# Patient Record
Sex: MALE | Race: WHITE | NOT HISPANIC OR LATINO | Employment: FULL TIME | URBAN - NONMETROPOLITAN AREA
[De-identification: names, ages, dates, MRNs, and addresses within clinical notes are randomized per-mention and may not be internally consistent; named-entity substitution may affect disease eponyms.]

---

## 2019-10-08 ENCOUNTER — NURSE TRIAGE (OUTPATIENT)
Dept: CALL CENTER | Facility: HOSPITAL | Age: 49
End: 2019-10-08

## 2019-10-08 NOTE — TELEPHONE ENCOUNTER
"    Reason for Disposition  • General information question, no triage required and triager able to answer question    Additional Information  • Negative: [1] Caller is not with the adult (patient) AND [2] reporting urgent symptoms  • Negative: Lab result questions  • Negative: Medication questions  • Negative: Caller cannot be reached by phone  • Negative: Caller has already spoken to PCP or another triager  • Negative: RN needs further essential information from caller in order to complete triage  • Negative: Requesting regular office appointment  • Negative: [1] Caller requesting NON-URGENT health information AND [2] PCP's office is the best resource  • Negative: Health Information question, no triage required and triager able to answer question    Answer Assessment - Initial Assessment Questions  1. REASON FOR CALL or QUESTION: \"What is your reason for calling today?\" or \"How can I best help you?\" or \"What question do you have that I can help answer?\"      Caller was speaking to someone yesterday about his upcoming surgery. He feels that her name is Meena, but he is not sure where she was calling from. Call was transferred to surgery scheduling.    Protocols used: INFORMATION ONLY CALL-ADULT-      "

## 2019-10-09 ENCOUNTER — APPOINTMENT (OUTPATIENT)
Dept: PREADMISSION TESTING | Facility: HOSPITAL | Age: 49
End: 2019-10-09

## 2019-10-09 ENCOUNTER — HOSPITAL ENCOUNTER (OUTPATIENT)
Dept: GENERAL RADIOLOGY | Facility: HOSPITAL | Age: 49
Discharge: HOME | End: 2019-10-09
Admitting: ORTHOPAEDIC SURGERY

## 2019-10-09 VITALS
HEIGHT: 69 IN | SYSTOLIC BLOOD PRESSURE: 136 MMHG | WEIGHT: 198.41 LBS | DIASTOLIC BLOOD PRESSURE: 73 MMHG | BODY MASS INDEX: 29.39 KG/M2 | RESPIRATION RATE: 16 BRPM | HEART RATE: 66 BPM | OXYGEN SATURATION: 96 %

## 2019-10-09 LAB
ALBUMIN SERPL-MCNC: 4.4 G/DL (ref 3.5–5.2)
ALBUMIN/GLOB SERPL: 1.5 G/DL
ALP SERPL-CCNC: 75 U/L (ref 39–117)
ALT SERPL W P-5'-P-CCNC: 40 U/L (ref 1–41)
ANION GAP SERPL CALCULATED.3IONS-SCNC: 13 MMOL/L (ref 5–15)
APTT PPP: 26.2 SECONDS (ref 24.1–35)
AST SERPL-CCNC: 23 U/L (ref 1–40)
BASOPHILS # BLD AUTO: 0.06 10*3/MM3 (ref 0–0.2)
BASOPHILS NFR BLD AUTO: 0.9 % (ref 0–1.5)
BILIRUB SERPL-MCNC: 0.4 MG/DL (ref 0.2–1.2)
BILIRUB UR QL STRIP: NEGATIVE
BUN BLD-MCNC: 10 MG/DL (ref 6–20)
BUN/CREAT SERPL: 14.7 (ref 7–25)
CALCIUM SPEC-SCNC: 9.1 MG/DL (ref 8.6–10.5)
CHLORIDE SERPL-SCNC: 95 MMOL/L (ref 98–107)
CLARITY UR: CLEAR
CO2 SERPL-SCNC: 26 MMOL/L (ref 22–29)
COLOR UR: YELLOW
CREAT BLD-MCNC: 0.68 MG/DL (ref 0.76–1.27)
DEPRECATED RDW RBC AUTO: 40.4 FL (ref 37–54)
EOSINOPHIL # BLD AUTO: 0.29 10*3/MM3 (ref 0–0.4)
EOSINOPHIL NFR BLD AUTO: 4.5 % (ref 0.3–6.2)
ERYTHROCYTE [DISTWIDTH] IN BLOOD BY AUTOMATED COUNT: 12.4 % (ref 12.3–15.4)
GFR SERPL CREATININE-BSD FRML MDRD: 124 ML/MIN/1.73
GLOBULIN UR ELPH-MCNC: 2.9 GM/DL
GLUCOSE BLD-MCNC: 340 MG/DL (ref 65–99)
GLUCOSE UR STRIP-MCNC: ABNORMAL MG/DL
HCT VFR BLD AUTO: 45 % (ref 37.5–51)
HGB BLD-MCNC: 15.9 G/DL (ref 13–17.7)
HGB UR QL STRIP.AUTO: NEGATIVE
IMM GRANULOCYTES # BLD AUTO: 0.02 10*3/MM3 (ref 0–0.05)
IMM GRANULOCYTES NFR BLD AUTO: 0.3 % (ref 0–0.5)
INR PPP: 0.88 (ref 0.91–1.09)
KETONES UR QL STRIP: NEGATIVE
LEUKOCYTE ESTERASE UR QL STRIP.AUTO: NEGATIVE
LYMPHOCYTES # BLD AUTO: 1.7 10*3/MM3 (ref 0.7–3.1)
LYMPHOCYTES NFR BLD AUTO: 26.3 % (ref 19.6–45.3)
MCH RBC QN AUTO: 31.1 PG (ref 26.6–33)
MCHC RBC AUTO-ENTMCNC: 35.3 G/DL (ref 31.5–35.7)
MCV RBC AUTO: 87.9 FL (ref 79–97)
MONOCYTES # BLD AUTO: 0.79 10*3/MM3 (ref 0.1–0.9)
MONOCYTES NFR BLD AUTO: 12.2 % (ref 5–12)
NEUTROPHILS # BLD AUTO: 3.6 10*3/MM3 (ref 1.7–7)
NEUTROPHILS NFR BLD AUTO: 55.8 % (ref 42.7–76)
NITRITE UR QL STRIP: NEGATIVE
NRBC BLD AUTO-RTO: 0 /100 WBC (ref 0–0.2)
PH UR STRIP.AUTO: 8 [PH] (ref 5–8)
PLATELET # BLD AUTO: 208 10*3/MM3 (ref 140–450)
PMV BLD AUTO: 10 FL (ref 6–12)
POTASSIUM BLD-SCNC: 4.3 MMOL/L (ref 3.5–5.2)
PROT SERPL-MCNC: 7.3 G/DL (ref 6–8.5)
PROT UR QL STRIP: NEGATIVE
PROTHROMBIN TIME: 12.2 SECONDS (ref 11.9–14.6)
RBC # BLD AUTO: 5.12 10*6/MM3 (ref 4.14–5.8)
SODIUM BLD-SCNC: 134 MMOL/L (ref 136–145)
SP GR UR STRIP: >1.03 (ref 1–1.03)
UROBILINOGEN UR QL STRIP: ABNORMAL
WBC NRBC COR # BLD: 6.46 10*3/MM3 (ref 3.4–10.8)

## 2019-10-09 PROCEDURE — 36415 COLL VENOUS BLD VENIPUNCTURE: CPT

## 2019-10-09 PROCEDURE — 85610 PROTHROMBIN TIME: CPT | Performed by: ORTHOPAEDIC SURGERY

## 2019-10-09 PROCEDURE — 71046 X-RAY EXAM CHEST 2 VIEWS: CPT

## 2019-10-09 PROCEDURE — 93005 ELECTROCARDIOGRAM TRACING: CPT

## 2019-10-09 PROCEDURE — 85730 THROMBOPLASTIN TIME PARTIAL: CPT | Performed by: ORTHOPAEDIC SURGERY

## 2019-10-09 PROCEDURE — 93010 ELECTROCARDIOGRAM REPORT: CPT | Performed by: INTERNAL MEDICINE

## 2019-10-09 PROCEDURE — 85025 COMPLETE CBC W/AUTO DIFF WBC: CPT | Performed by: ORTHOPAEDIC SURGERY

## 2019-10-09 PROCEDURE — 81003 URINALYSIS AUTO W/O SCOPE: CPT | Performed by: ORTHOPAEDIC SURGERY

## 2019-10-09 PROCEDURE — 80053 COMPREHEN METABOLIC PANEL: CPT | Performed by: ORTHOPAEDIC SURGERY

## 2019-10-09 RX ORDER — CYCLOBENZAPRINE HYDROCHLORIDE 7.5 MG/1
7.5 TABLET, FILM COATED ORAL 3 TIMES DAILY PRN
COMMUNITY

## 2019-10-09 RX ORDER — TRAMADOL HYDROCHLORIDE 50 MG/1
50 TABLET ORAL EVERY 8 HOURS PRN
COMMUNITY
End: 2019-10-17 | Stop reason: HOSPADM

## 2019-10-09 RX ORDER — MELOXICAM 15 MG/1
15 TABLET ORAL DAILY
COMMUNITY
End: 2019-10-17 | Stop reason: HOSPADM

## 2019-10-09 RX ORDER — IBUPROFEN 600 MG/1
600 TABLET ORAL EVERY 6 HOURS PRN
COMMUNITY
End: 2019-10-17 | Stop reason: HOSPADM

## 2019-10-09 NOTE — DISCHARGE INSTRUCTIONS
DAY OF SURGERY INSTRUCTIONS        YOUR SURGEON: Dr. Pop    PROCEDURE: Anterior Cervical Discectomy Fusion Cervical 3-7    DATE OF SURGERY: October 18, 2019    ARRIVAL TIME: AS DIRECTED BY OFFICE    YOU MAY TAKE THE FOLLOWING MEDICATION(S) THE MORNING OF SURGERY WITH A SIP OF WATER: Tramadol      ALL OTHER HOME MEDICATION CHECK WITH YOUR PHYSICIAN                MANAGING PAIN AFTER SURGERY    We know you are probably wondering what your pain will be like after surgery.  Following surgery it is unrealistic to expect you will not have pain.   Pain is how our bodies let us know that something is wrong or cautions us to be careful.  That said, our goal is to make your pain tolerable.    Methods we may use to treat your pain include (oral or IV medications, PCAs, epidurals, nerve blocks, etc.)   While some procedures require IV pain medications for a short time after surgery, transitioning to pain medications by mouth allows for better management of pain.   Your nurse will encourage you to take oral pain medications whenever possible.  IV medications work almost immediately, but only last a short while.  Taking medications by mouth allows for a more constant level of medication in your blood stream for a longer period of time.      Once your pain is out of control it is harder to get back under control.  It is important you are aware when your next dose of pain medication is due.  If you are admitted, your nurse may write the time of your next dose on the white board in your room to help you remember.      We are interested in your pain and encourage you to inform us about aggravating factors during your visit.   Many times a simple repositioning every few hours can make a big difference.    If your physician says it is okay, do not let your pain prevent you from getting out of bed. Be sure to call your nurse for assistance prior to getting up so you do not fall.      Before surgery, please decide your tolerable  pain goal.  These faces help describe the pain ratings we use on a 0-10 scale.   Be prepared to tell us your goal and whether or not you take pain or anxiety medications at home.          BEFORE YOU COME TO THE HOSPITAL  (Pre-op instructions)  • Do not eat, drink, smoke or chew gum after midnight the night before surgery.  This also includes no mints.  • Morning of surgery take only the medicines you have been instructed with a sip of water unless otherwise instructed  by your physician.  • Do not shave, wear makeup or dark nail polish.  • Remove all jewelry including rings.  • Leave anything you consider valuable at home.  • Leave your suitcase in the car until after your surgery.  • Bring the following with you if applicable:  o Picture ID and insurance, Medicare or Medicaid cards  o Co-pay/deductible required by insurance (cash, check, credit card)  o Copy of advance directive, living will or power-of- documents if not brought to PAT  o CPAP or BIPAP mask and tubing  o Relaxation aids ( book, magazine), etc.  o Hearing aids                        ON THE DAY OF SURGERY  · On the day of surgery check in at registration located at the main entrance of the hospital.   ? You will be registered and given a beeper with instructions where to wait in the main lobby.  ? When your beeper lights up and vibrates a member of the Outpatient Surgery staff will meet you at the double doors under the stair steps and escort you to your preoperative room.   · You may have cloth compression devices placed on your legs. These help to prevent blood clots and reduce swelling in your legs.  · An IV may be inserted into one of your veins.  · In the operating room, you may be given one or more of the following:  ? A medicine to help you relax (sedative).  ? A medicine to numb the area (local anesthetic).  ? A medicine to make you fall asleep (general anesthetic).  ? A medicine that is injected into an area of your body to numb  "everything below the injection site (regional anesthetic).  · Your surgical site will be marked or identified.  · You may be given an antibiotic through your IV to help prevent infection.  Contact a health care provider if you:  · Develop a fever of more than 100.4°F (38°C) or other feelings of illness during the 48 hours before your surgery.  · Have symptoms that get worse.  Have questions or concerns about your surgery    General Anesthesia/Surgery, Adult  General anesthesia is the use of medicines to make a person \"go to sleep\" (unconscious) for a medical procedure. General anesthesia must be used for certain procedures, and is often recommended for procedures that:  · Last a long time.  · Require you to be still or in an unusual position.  · Are major and can cause blood loss.  The medicines used for general anesthesia are called general anesthetics. As well as making you unconscious for a certain amount of time, these medicines:  · Prevent pain.  · Control your blood pressure.  · Relax your muscles.  Tell a health care provider about:  · Any allergies you have.  · All medicines you are taking, including vitamins, herbs, eye drops, creams, and over-the-counter medicines.  · Any problems you or family members have had with anesthetic medicines.  · Types of anesthetics you have had in the past.  · Any blood disorders you have.  · Any surgeries you have had.  · Any medical conditions you have.  · Any recent upper respiratory, chest, or ear infections.  · Any history of:  ? Heart or lung conditions, such as heart failure, sleep apnea, asthma, or chronic obstructive pulmonary disease (COPD).  ?  service.  ? Depression or anxiety.  · Any tobacco or drug use, including marijuana or alcohol use.  · Whether you are pregnant or may be pregnant.  What are the risks?  Generally, this is a safe procedure. However, problems may occur, including:  · Allergic reaction.  · Lung and heart problems.  · Inhaling food or " liquid from the stomach into the lungs (aspiration).  · Nerve injury.  · Air in the bloodstream, which can lead to stroke.  · Extreme agitation or confusion (delirium) when you wake up from the anesthetic.  · Waking up during your procedure and being unable to move. This is rare.  These problems are more likely to develop if you are having a major surgery or if you have an advanced or serious medical condition. You can prevent some of these complications by answering all of your health care provider's questions thoroughly and by following all instructions before your procedure.  General anesthesia can cause side effects, including:  · Nausea or vomiting.  · A sore throat from the breathing tube.  · Hoarseness.  · Wheezing or coughing.  · Shaking chills.  · Tiredness.  · Body aches.  · Anxiety.  · Sleepiness or drowsiness.  · Confusion or agitation.  RISKS AND COMPLICATIONS OF SURGERY  Your health care provider will discuss possible risks and complications with you before surgery. Common risks and complications include:    · Problems due to the use of anesthetics.  · Blood loss and replacement (does not apply to minor surgical procedures).  · Temporary increase in pain due to surgery.  · Uncorrected pain or problems that the surgery was meant to correct.  · Infection.  · New damage.    What happens before the procedure?    Medicines  Ask your health care provider about:  · Changing or stopping your regular medicines. This is especially important if you are taking diabetes medicines or blood thinners.  · Taking medicines such as aspirin and ibuprofen. These medicines can thin your blood. Do not take these medicines unless your health care provider tells you to take them.  · Taking over-the-counter medicines, vitamins, herbs, and supplements. Do not take these during the week before your procedure unless your health care provider approves them.  General instructions  · Starting 3-6 weeks before the procedure, do not  use any products that contain nicotine or tobacco, such as cigarettes and e-cigarettes. If you need help quitting, ask your health care provider.  · If you brush your teeth on the morning of the procedure, make sure to spit out all of the toothpaste.  · Tell your health care provider if you become ill or develop a cold, cough, or fever.  · If instructed by your health care provider, bring your sleep apnea device with you on the day of your surgery (if applicable).  · Ask your health care provider if you will be going home the same day, the following day, or after a longer hospital stay.  ? Plan to have someone take you home from the hospital or clinic.  ? Plan to have a responsible adult care for you for at least 24 hours after you leave the hospital or clinic. This is important.  What happens during the procedure?  · You will be given anesthetics through both of the following:  ? A mask placed over your nose and mouth.  ? An IV in one of your veins.  · You may receive a medicine to help you relax (sedative).  · After you are unconscious, a breathing tube may be inserted down your throat to help you breathe. This will be removed before you wake up.  · An anesthesia specialist will stay with you throughout your procedure. He or she will:  ? Keep you comfortable and safe by continuing to give you medicines and adjusting the amount of medicine that you get.  ? Monitor your blood pressure, pulse, and oxygen levels to make sure that the anesthetics do not cause any problems.  The procedure may vary among health care providers and hospitals.  What happens after the procedure?  · Your blood pressure, temperature, heart rate, breathing rate, and blood oxygen level will be monitored until the medicines you were given have worn off.  · You will wake up in a recovery area. You may wake up slowly.  · If you feel anxious or agitated, you may be given medicine to help you calm down.  · If you will be going home the same day, your  health care provider may check to make sure you can walk, drink, and urinate.  · Your health care provider will treat any pain or side effects you have before you go home.  · Do not drive for 24 hours if you were given a sedative.  Summary  · General anesthesia is used to keep you still and prevent pain during a procedure.  · It is important to tell your healthcare provider about your medical history and any surgeries you have had, and previous experience with anesthesia.  · Follow your healthcare provider’s instructions about when to stop eating, drinking, or taking certain medicines before your procedure.  · Plan to have someone take you home from the hospital or clinic.  This information is not intended to replace advice given to you by your health care provider. Make sure you discuss any questions you have with your health care provider.  Document Released: 03/26/2009 Document Revised: 08/03/2018 Document Reviewed: 08/03/2018  [employer] Interactive Patient Education © 2019 [employer]       Fall Prevention in Hospitals, Adult  As a hospital patient, your condition and the treatments you receive can increase your risk for falls. Some additional risk factors for falls in a hospital include:  · Being in an unfamiliar environment.  · Being on bed rest.  · Your surgery.  · Taking certain medicines.  · Your tubing requirements, such as intravenous (IV) therapy or catheters.  It is important that you learn how to decrease fall risks while at the hospital. Below are important tips that can help prevent falls.  SAFETY TIPS FOR PREVENTING FALLS  Talk about your risk of falling.  · Ask your health care provider why you are at risk for falling. Is it your medicine, illness, tubing placement, or something else?  · Make a plan with your health care provider to keep you safe from falls.  · Ask your health care provider or pharmacist about side effects of your medicines. Some medicines can make you dizzy or affect your  coordination.  Ask for help.  · Ask for help before getting out of bed. You may need to press your call button.  · Ask for assistance in getting safely to the toilet.  · Ask for a walker or cane to be put at your bedside. Ask that most of the side rails on your bed be placed up before your health care provider leaves the room.  · Ask family or friends to sit with you.  · Ask for things that are out of your reach, such as your glasses, hearing aids, telephone, bedside table, or call button.  Follow these tips to avoid falling:  · Stay lying or seated, rather than standing, while waiting for help.  · Wear rubber-soled slippers or shoes whenever you walk in the hospital.  · Avoid quick, sudden movements.  ¨ Change positions slowly.  ¨ Sit on the side of your bed before standing.  ¨ Stand up slowly and wait before you start to walk.  · Let your health care provider know if there is a spill on the floor.  · Pay careful attention to the medical equipment, electrical cords, and tubes around you.  · When you need help, use your call button by your bed or in the bathroom. Wait for one of your health care providers to help you.  · If you feel dizzy or unsure of your footing, return to bed and wait for assistance.  · Avoid being distracted by the TV, telephone, or another person in your room.  · Do not lean or support yourself on rolling objects, such as IV poles or bedside tables.     This information is not intended to replace advice given to you by your health care provider. Make sure you discuss any questions you have with your health care provider.     Document Released: 12/15/2001 Document Revised: 01/08/2016 Document Reviewed: 08/25/2013  [employer] Interactive Patient Education ©2016 [employer]       Surgical Site Infections FAQs  What is a Surgical Site Infection (SSI)?  A surgical site infection is an infection that occurs after surgery in the part of the body where the surgery took place. Most patients who have  surgery do not develop an infection. However, infections develop in about 1 to 3 out of every 100 patients who have surgery.  Some of the common symptoms of a surgical site infection are:  · Redness and pain around the area where you had surgery  · Drainage of cloudy fluid from your surgical wound  · Fever  Can SSIs be treated?  Yes. Most surgical site infections can be treated with antibiotics. The antibiotic given to you depends on the bacteria (germs) causing the infection. Sometimes patients with SSIs also need another surgery to treat the infection.  What are some of the things that hospitals are doing to prevent SSIs?  To prevent SSIs, doctors, nurses, and other healthcare providers:  · Clean their hands and arms up to their elbows with an antiseptic agent just before the surgery.  · Clean their hands with soap and water or an alcohol-based hand rub before and after caring for each patient.  · May remove some of your hair immediately before your surgery using electric clippers if the hair is in the same area where the procedure will occur. They should not shave you with a razor.  · Wear special hair covers, masks, gowns, and gloves during surgery to keep the surgery area clean.  · Give you antibiotics before your surgery starts. In most cases, you should get antibiotics within 60 minutes before the surgery starts and the antibiotics should be stopped within 24 hours after surgery.  · Clean the skin at the site of your surgery with a special soap that kills germs.  What can I do to help prevent SSIs?  Before your surgery:  · Tell your doctor about other medical problems you may have. Health problems such as allergies, diabetes, and obesity could affect your surgery and your treatment.  · Quit smoking. Patients who smoke get more infections. Talk to your doctor about how you can quit before your surgery.  · Do not shave near where you will have surgery. Shaving with a razor can irritate your skin and make it  easier to develop an infection.  At the time of your surgery:  · Speak up if someone tries to shave you with a razor before surgery. Ask why you need to be shaved and talk with your surgeon if you have any concerns.  · Ask if you will get antibiotics before surgery.  After your surgery:  · Make sure that your healthcare providers clean their hands before examining you, either with soap and water or an alcohol-based hand rub.  · If you do not see your providers clean their hands, please ask them to do so.  · Family and friends who visit you should not touch the surgical wound or dressings.  · Family and friends should clean their hands with soap and water or an alcohol-based hand rub before and after visiting you. If you do not see them clean their hands, ask them to clean their hands.  What do I need to do when I go home from the hospital?  · Before you go home, your doctor or nurse should explain everything you need to know about taking care of your wound. Make sure you understand how to care for your wound before you leave the hospital.  · Always clean your hands before and after caring for your wound.  · Before you go home, make sure you know who to contact if you have questions or problems after you get home.  · If you have any symptoms of an infection, such as redness and pain at the surgery site, drainage, or fever, call your doctor immediately.  If you have additional questions, please ask your doctor or nurse.  Developed and co-sponsored by The Society for Healthcare Epidemiology of [place] (SHEA); Infectious Diseases Society of [place] (IDSA); American Hospital Association; Association for Professionals in Infection Control and Epidemiology (APIC); Centers for Disease Control and Prevention (CDC); and The Joint Commission.     This information is not intended to replace advice given to you by your health care provider. Make sure you discuss any questions you have with your health care provider.     Document  Released: 12/23/2014 Document Revised: 01/08/2016 Document Reviewed: 03/02/2016  [employer] Interactive Patient Education ©2016 [employer]           [facility]  CHG 4% Patient Instruction Sheet    Chlorhexidine Before Surgery  Chlorhexidine gluconate (CHG) is a germ-killing (antiseptic) solution that is used to clean the skin. It gets rid of the bacteria that normally live on the skin. Cleaning your skin with CHG before surgery helps lower the risk for infection after surgery.    How to use CHG solution  · You will take 2 showers, one shower the night before surgery, the second shower the morning of surgery before coming to the hospital.  · Use CHG only as told by your health care provider, and follow the instructions on the label.  · Use CHG solution while taking a shower. Follow these steps when using CHG solution (unless your health care provider gives you different instructions):  1. Start the shower.  2. Use your normal soap and shampoo to wash your face and hair.  3. Turn off the shower or move out of the shower stream.  4. Pour the CHG onto a clean washcloth. Do not use any type of brush or rough-edged sponge.  5. Starting at your neck, lather your body down to your toes. Make sure you:  6. Pay special attention to the part of your body where you will be having surgery. Scrub this area for at least 1 minute.  7. Use the full amount of CHG as directed. Usually, this is one half bottle for each shower.  8. Do not use CHG on your head or face. If the solution gets into your ears or eyes, rinse them well with water.  9. Avoid your genital area.  10. Avoid any areas of skin that have broken skin, cuts, or scrapes.  11. Scrub your back and under your arms. Make sure to wash skin folds.  12. Let the lather sit on your skin for 1-2 minutes or as long as told by your health care  provider.  13. Thoroughly rinse your entire body in the shower. Make sure that all body creases and crevices are rinsed  well.  14. Dry off with a clean towel. Do not put any substances on your body afterward, such as powder, lotion, or perfume.  15. Put on clean clothes or pajamas.  16. If it is the night before your surgery, sleep in clean sheets.    What are the risks?  Risks of using CHG include:  · A skin reaction.  · Hearing loss, if CHG gets in your ears.  · Eye injury, if CHG gets in your eyes and is not rinsed out.  · The CHG product catching fire.  Make sure that you avoid smoking and flames after applying CHG to your skin.  Do not use CHG:  · If you have a chlorhexidine allergy or have previously reacted to chlorhexidine.  · On babies younger than 2 months of age.      On the day of surgery, when you are taken to your room in Outpatient Surgery you will be given a CHG prepackaged cloth to wipe the site for your surgery.  How to use CHG prepackaged cloths  · Follow the instructions on the label.  · Use the CHG cloth on clean, dry skin. Follow these steps when using a CHG cloth (unless your health care provider gives you different instructions):  1. Using the CHG cloth, vigorously scrub the part of your body where you will be having surgery. Scrub using a back-and-forth motion for 3 minutes. The area on your body should be completely wet with CHG when you are finished scrubbing.  2. Do not rinse. Discard the cloth and let the area air-dry for 1 minute. Do not put any substances on your body afterward, such as powder, lotion, or perfume.  Contact a health care provider if:  · Your skin gets irritated after scrubbing.  · You have questions about using your solution or cloth.  Get help right away if:  · Your eyes become very red or swollen.  · Your eyes itch badly.  · Your skin itches badly and is red or swollen.  · Your hearing changes.  · You have trouble seeing.  · You have swelling or tingling in your mouth or throat.  · You have trouble breathing.  · You swallow any chlorhexidine.  Summary  · Chlorhexidine gluconate (CHG)  is a germ-killing (antiseptic) solution that is used to clean the skin. Cleaning your skin with CHG before surgery helps lower the risk for infection after surgery.  · You may be given CHG to use at home. It may be in a bottle or in a prepackaged cloth to use on your skin. Carefully follow your health care provider's instructions and the instructions on the product label.  · Do not use CHG if you have a chlorhexidine allergy.  · Contact your health care provider if your skin gets irritated after scrubbing.  This information is not intended to replace advice given to you by your health care provider. Make sure you discuss any questions you have with your health care provider.  Document Released: 09/11/2013 Document Revised: 11/15/2018 Document Reviewed: 11/15/2018  [employer] Interactive Patient Education © 2019 [employer]          PATIENT/FAMILY/RESPONSIBLE PARTY VERBALIZES UNDERSTANDING OF ABOVE EDUCATION.  COPY OF PAIN SCALE GIVEN AND REVIEWED WITH VERBALIZED UNDERSTANDING.

## 2019-10-16 ENCOUNTER — ANESTHESIA EVENT (OUTPATIENT)
Dept: PERIOP | Facility: HOSPITAL | Age: 49
End: 2019-10-16

## 2019-10-16 ENCOUNTER — ANESTHESIA (OUTPATIENT)
Dept: PERIOP | Facility: HOSPITAL | Age: 49
End: 2019-10-16

## 2019-10-16 ENCOUNTER — HOSPITAL ENCOUNTER (INPATIENT)
Facility: HOSPITAL | Age: 49
LOS: 1 days | Discharge: HOME | End: 2019-10-17
Attending: ORTHOPAEDIC SURGERY | Admitting: ORTHOPAEDIC SURGERY

## 2019-10-16 ENCOUNTER — APPOINTMENT (OUTPATIENT)
Dept: GENERAL RADIOLOGY | Facility: HOSPITAL | Age: 49
End: 2019-10-16

## 2019-10-16 DIAGNOSIS — Z78.9 DECREASED ACTIVITIES OF DAILY LIVING (ADL): ICD-10-CM

## 2019-10-16 DIAGNOSIS — Z74.09 IMPAIRED MOBILITY: Primary | ICD-10-CM

## 2019-10-16 PROBLEM — M48.02 STENOSIS, CERVICAL SPINE: Status: ACTIVE | Noted: 2019-10-16

## 2019-10-16 LAB
ABO GROUP BLD: NORMAL
BLD GP AB SCN SERPL QL: NEGATIVE
GLUCOSE BLDC GLUCOMTR-MCNC: 243 MG/DL (ref 70–130)
GLUCOSE BLDC GLUCOMTR-MCNC: 264 MG/DL (ref 70–130)
RH BLD: POSITIVE
T&S EXPIRATION DATE: NORMAL

## 2019-10-16 PROCEDURE — 86850 RBC ANTIBODY SCREEN: CPT | Performed by: ORTHOPAEDIC SURGERY

## 2019-10-16 PROCEDURE — 76000 FLUOROSCOPY <1 HR PHYS/QHP: CPT

## 2019-10-16 PROCEDURE — 25010000002 ONDANSETRON PER 1 MG: Performed by: NURSE ANESTHETIST, CERTIFIED REGISTERED

## 2019-10-16 PROCEDURE — 01N10ZZ RELEASE CERVICAL NERVE, OPEN APPROACH: ICD-10-PCS | Performed by: ORTHOPAEDIC SURGERY

## 2019-10-16 PROCEDURE — 25010000002 DEXAMETHASONE PER 1 MG: Performed by: NURSE ANESTHETIST, CERTIFIED REGISTERED

## 2019-10-16 PROCEDURE — 82962 GLUCOSE BLOOD TEST: CPT

## 2019-10-16 PROCEDURE — 25010000002 DEXAMETHASONE PER 1 MG: Performed by: ANESTHESIOLOGY

## 2019-10-16 PROCEDURE — 86900 BLOOD TYPING SEROLOGIC ABO: CPT | Performed by: ORTHOPAEDIC SURGERY

## 2019-10-16 PROCEDURE — 25010000002 FENTANYL CITRATE (PF) 100 MCG/2ML SOLUTION: Performed by: ANESTHESIOLOGY

## 2019-10-16 PROCEDURE — 4A11X4G MONITORING OF PERIPHERAL NERVOUS ELECTRICAL ACTIVITY, INTRAOPERATIVE, EXTERNAL APPROACH: ICD-10-PCS | Performed by: ORTHOPAEDIC SURGERY

## 2019-10-16 PROCEDURE — 94799 UNLISTED PULMONARY SVC/PX: CPT

## 2019-10-16 PROCEDURE — C1713 ANCHOR/SCREW BN/BN,TIS/BN: HCPCS | Performed by: ORTHOPAEDIC SURGERY

## 2019-10-16 PROCEDURE — 0RG20A0 FUSION OF 2 OR MORE CERVICAL VERTEBRAL JOINTS WITH INTERBODY FUSION DEVICE, ANTERIOR APPROACH, ANTERIOR COLUMN, OPEN APPROACH: ICD-10-PCS | Performed by: ORTHOPAEDIC SURGERY

## 2019-10-16 PROCEDURE — 86901 BLOOD TYPING SEROLOGIC RH(D): CPT | Performed by: ORTHOPAEDIC SURGERY

## 2019-10-16 PROCEDURE — 25010000002 MORPHINE PER 10 MG: Performed by: ANESTHESIOLOGY

## 2019-10-16 PROCEDURE — 25010000002 PROPOFOL 10 MG/ML EMULSION: Performed by: NURSE ANESTHETIST, CERTIFIED REGISTERED

## 2019-10-16 PROCEDURE — 72040 X-RAY EXAM NECK SPINE 2-3 VW: CPT

## 2019-10-16 PROCEDURE — 25010000003 CEFAZOLIN 1-4 GM/50ML-% SOLUTION: Performed by: ORTHOPAEDIC SURGERY

## 2019-10-16 PROCEDURE — 25010000002 MIDAZOLAM PER 1 MG: Performed by: ANESTHESIOLOGY

## 2019-10-16 PROCEDURE — 25010000002 ONDANSETRON PER 1 MG: Performed by: ANESTHESIOLOGY

## 2019-10-16 PROCEDURE — 0RT30ZZ RESECTION OF CERVICAL VERTEBRAL DISC, OPEN APPROACH: ICD-10-PCS | Performed by: ORTHOPAEDIC SURGERY

## 2019-10-16 DEVICE — INTERBODY FUSION DEVICE
Type: IMPLANTABLE DEVICE | Status: FUNCTIONAL
Brand: FORTILINK-C IBF SYSTEM

## 2019-10-16 DEVICE — SCRW VARI SLF/DRL 4X14MM: Type: IMPLANTABLE DEVICE | Status: FUNCTIONAL

## 2019-10-16 DEVICE — IMPLANTABLE DEVICE: Type: IMPLANTABLE DEVICE | Status: FUNCTIONAL

## 2019-10-16 DEVICE — ALLOGRFT FLD BIOBURST 1ML: Type: IMPLANTABLE DEVICE | Status: FUNCTIONAL

## 2019-10-16 DEVICE — BONE CANC CRUSHED 10CC 1TO4MM: Type: IMPLANTABLE DEVICE | Status: FUNCTIONAL

## 2019-10-16 DEVICE — SCRW VARI SLF/DRL 4X16MM: Type: IMPLANTABLE DEVICE | Status: FUNCTIONAL

## 2019-10-16 RX ORDER — ONDANSETRON 2 MG/ML
4 INJECTION INTRAMUSCULAR; INTRAVENOUS EVERY 6 HOURS PRN
Status: DISCONTINUED | OUTPATIENT
Start: 2019-10-16 | End: 2019-10-16

## 2019-10-16 RX ORDER — SODIUM CHLORIDE 9 MG/ML
75 INJECTION, SOLUTION INTRAVENOUS CONTINUOUS
Status: DISCONTINUED | OUTPATIENT
Start: 2019-10-16 | End: 2019-10-17 | Stop reason: HOSPADM

## 2019-10-16 RX ORDER — FAMOTIDINE 10 MG/ML
20 INJECTION, SOLUTION INTRAVENOUS EVERY 12 HOURS SCHEDULED
Status: DISCONTINUED | OUTPATIENT
Start: 2019-10-16 | End: 2019-10-17 | Stop reason: HOSPADM

## 2019-10-16 RX ORDER — IPRATROPIUM BROMIDE AND ALBUTEROL SULFATE 2.5; .5 MG/3ML; MG/3ML
3 SOLUTION RESPIRATORY (INHALATION) ONCE AS NEEDED
Status: DISCONTINUED | OUTPATIENT
Start: 2019-10-16 | End: 2019-10-16 | Stop reason: HOSPADM

## 2019-10-16 RX ORDER — SODIUM CHLORIDE 0.9 % (FLUSH) 0.9 %
3-10 SYRINGE (ML) INJECTION AS NEEDED
Status: DISCONTINUED | OUTPATIENT
Start: 2019-10-16 | End: 2019-10-16

## 2019-10-16 RX ORDER — SODIUM CHLORIDE, SODIUM LACTATE, POTASSIUM CHLORIDE, CALCIUM CHLORIDE 600; 310; 30; 20 MG/100ML; MG/100ML; MG/100ML; MG/100ML
1000 INJECTION, SOLUTION INTRAVENOUS CONTINUOUS
Status: DISCONTINUED | OUTPATIENT
Start: 2019-10-16 | End: 2019-10-16

## 2019-10-16 RX ORDER — SODIUM CHLORIDE 0.9 % (FLUSH) 0.9 %
10 SYRINGE (ML) INJECTION AS NEEDED
Status: DISCONTINUED | OUTPATIENT
Start: 2019-10-16 | End: 2019-10-16

## 2019-10-16 RX ORDER — SODIUM CHLORIDE 0.9 % (FLUSH) 0.9 %
10 SYRINGE (ML) INJECTION AS NEEDED
Status: DISCONTINUED | OUTPATIENT
Start: 2019-10-16 | End: 2019-10-17 | Stop reason: HOSPADM

## 2019-10-16 RX ORDER — SODIUM CHLORIDE, SODIUM LACTATE, POTASSIUM CHLORIDE, CALCIUM CHLORIDE 600; 310; 30; 20 MG/100ML; MG/100ML; MG/100ML; MG/100ML
100 INJECTION, SOLUTION INTRAVENOUS CONTINUOUS PRN
Status: DISCONTINUED | OUTPATIENT
Start: 2019-10-16 | End: 2019-10-16

## 2019-10-16 RX ORDER — DEXAMETHASONE SODIUM PHOSPHATE 4 MG/ML
4 INJECTION, SOLUTION INTRA-ARTICULAR; INTRALESIONAL; INTRAMUSCULAR; INTRAVENOUS; SOFT TISSUE ONCE AS NEEDED
Status: COMPLETED | OUTPATIENT
Start: 2019-10-16 | End: 2019-10-16

## 2019-10-16 RX ORDER — OXYCODONE HCL 20 MG/1
20 TABLET, FILM COATED, EXTENDED RELEASE ORAL ONCE
Status: COMPLETED | OUTPATIENT
Start: 2019-10-16 | End: 2019-10-16

## 2019-10-16 RX ORDER — MAGNESIUM HYDROXIDE 1200 MG/15ML
LIQUID ORAL AS NEEDED
Status: DISCONTINUED | OUTPATIENT
Start: 2019-10-16 | End: 2019-10-16 | Stop reason: HOSPADM

## 2019-10-16 RX ORDER — FENTANYL CITRATE 50 UG/ML
25 INJECTION, SOLUTION INTRAMUSCULAR; INTRAVENOUS
Status: DISCONTINUED | OUTPATIENT
Start: 2019-10-16 | End: 2019-10-16

## 2019-10-16 RX ORDER — ONDANSETRON 4 MG/1
4 TABLET, FILM COATED ORAL EVERY 6 HOURS PRN
Status: DISCONTINUED | OUTPATIENT
Start: 2019-10-16 | End: 2019-10-17 | Stop reason: HOSPADM

## 2019-10-16 RX ORDER — CEFAZOLIN SODIUM 1 G/50ML
1 INJECTION, SOLUTION INTRAVENOUS EVERY 8 HOURS SCHEDULED
Status: DISCONTINUED | OUTPATIENT
Start: 2019-10-16 | End: 2019-10-17 | Stop reason: HOSPADM

## 2019-10-16 RX ORDER — LABETALOL HYDROCHLORIDE 5 MG/ML
5 INJECTION, SOLUTION INTRAVENOUS
Status: DISCONTINUED | OUTPATIENT
Start: 2019-10-16 | End: 2019-10-16 | Stop reason: HOSPADM

## 2019-10-16 RX ORDER — BUPIVACAINE HCL/0.9 % NACL/PF 0.1 %
2 PLASTIC BAG, INJECTION (ML) EPIDURAL ONCE
Status: COMPLETED | OUTPATIENT
Start: 2019-10-16 | End: 2019-10-16

## 2019-10-16 RX ORDER — KETAMINE HYDROCHLORIDE 50 MG/ML
INJECTION, SOLUTION, CONCENTRATE INTRAMUSCULAR; INTRAVENOUS AS NEEDED
Status: DISCONTINUED | OUTPATIENT
Start: 2019-10-16 | End: 2019-10-16 | Stop reason: SURG

## 2019-10-16 RX ORDER — MINERAL OIL
OIL (ML) MISCELLANEOUS AS NEEDED
Status: DISCONTINUED | OUTPATIENT
Start: 2019-10-16 | End: 2019-10-16 | Stop reason: HOSPADM

## 2019-10-16 RX ORDER — HYDRALAZINE HYDROCHLORIDE 20 MG/ML
5 INJECTION INTRAMUSCULAR; INTRAVENOUS
Status: DISCONTINUED | OUTPATIENT
Start: 2019-10-16 | End: 2019-10-16 | Stop reason: HOSPADM

## 2019-10-16 RX ORDER — SODIUM CHLORIDE, SODIUM LACTATE, POTASSIUM CHLORIDE, CALCIUM CHLORIDE 600; 310; 30; 20 MG/100ML; MG/100ML; MG/100ML; MG/100ML
9 INJECTION, SOLUTION INTRAVENOUS CONTINUOUS
Status: DISCONTINUED | OUTPATIENT
Start: 2019-10-16 | End: 2019-10-16

## 2019-10-16 RX ORDER — FAMOTIDINE 10 MG/ML
20 INJECTION, SOLUTION INTRAVENOUS
Status: DISCONTINUED | OUTPATIENT
Start: 2019-10-16 | End: 2019-10-16

## 2019-10-16 RX ORDER — ONDANSETRON 2 MG/ML
4 INJECTION INTRAMUSCULAR; INTRAVENOUS AS NEEDED
Status: DISCONTINUED | OUTPATIENT
Start: 2019-10-16 | End: 2019-10-16 | Stop reason: HOSPADM

## 2019-10-16 RX ORDER — DIPHENHYDRAMINE HCL 25 MG
25 CAPSULE ORAL NIGHTLY PRN
Status: DISCONTINUED | OUTPATIENT
Start: 2019-10-16 | End: 2019-10-17 | Stop reason: HOSPADM

## 2019-10-16 RX ORDER — ACETAMINOPHEN 500 MG
1000 TABLET ORAL ONCE
Status: COMPLETED | OUTPATIENT
Start: 2019-10-16 | End: 2019-10-16

## 2019-10-16 RX ORDER — FENTANYL CITRATE 50 UG/ML
25 INJECTION, SOLUTION INTRAMUSCULAR; INTRAVENOUS AS NEEDED
Status: DISCONTINUED | OUTPATIENT
Start: 2019-10-16 | End: 2019-10-16 | Stop reason: HOSPADM

## 2019-10-16 RX ORDER — MORPHINE SULFATE 2 MG/ML
2 INJECTION, SOLUTION INTRAMUSCULAR; INTRAVENOUS
Status: DISCONTINUED | OUTPATIENT
Start: 2019-10-16 | End: 2019-10-16 | Stop reason: HOSPADM

## 2019-10-16 RX ORDER — OXYCODONE AND ACETAMINOPHEN 10; 325 MG/1; MG/1
2 TABLET ORAL EVERY 4 HOURS PRN
Status: DISCONTINUED | OUTPATIENT
Start: 2019-10-16 | End: 2019-10-17 | Stop reason: HOSPADM

## 2019-10-16 RX ORDER — FAMOTIDINE 20 MG/1
20 TABLET, FILM COATED ORAL EVERY 12 HOURS SCHEDULED
Status: DISCONTINUED | OUTPATIENT
Start: 2019-10-16 | End: 2019-10-17 | Stop reason: HOSPADM

## 2019-10-16 RX ORDER — MIDAZOLAM HYDROCHLORIDE 1 MG/ML
1 INJECTION INTRAMUSCULAR; INTRAVENOUS
Status: DISCONTINUED | OUTPATIENT
Start: 2019-10-16 | End: 2019-10-16

## 2019-10-16 RX ORDER — ONDANSETRON 2 MG/ML
4 INJECTION INTRAMUSCULAR; INTRAVENOUS EVERY 6 HOURS PRN
Status: DISCONTINUED | OUTPATIENT
Start: 2019-10-16 | End: 2019-10-17 | Stop reason: HOSPADM

## 2019-10-16 RX ORDER — SUFENTANIL CITRATE 50 UG/ML
INJECTION EPIDURAL; INTRAVENOUS AS NEEDED
Status: DISCONTINUED | OUTPATIENT
Start: 2019-10-16 | End: 2019-10-16 | Stop reason: SURG

## 2019-10-16 RX ORDER — LIDOCAINE HYDROCHLORIDE 20 MG/ML
INJECTION, SOLUTION INFILTRATION; PERINEURAL AS NEEDED
Status: DISCONTINUED | OUTPATIENT
Start: 2019-10-16 | End: 2019-10-16 | Stop reason: SURG

## 2019-10-16 RX ORDER — OXYCODONE AND ACETAMINOPHEN 10; 325 MG/1; MG/1
1 TABLET ORAL ONCE AS NEEDED
Status: DISCONTINUED | OUTPATIENT
Start: 2019-10-16 | End: 2019-10-16 | Stop reason: HOSPADM

## 2019-10-16 RX ORDER — NALOXONE HCL 0.4 MG/ML
0.04 VIAL (ML) INJECTION AS NEEDED
Status: DISCONTINUED | OUTPATIENT
Start: 2019-10-16 | End: 2019-10-16 | Stop reason: HOSPADM

## 2019-10-16 RX ORDER — SODIUM CHLORIDE 0.9 % (FLUSH) 0.9 %
3 SYRINGE (ML) INJECTION EVERY 12 HOURS SCHEDULED
Status: DISCONTINUED | OUTPATIENT
Start: 2019-10-16 | End: 2019-10-17 | Stop reason: HOSPADM

## 2019-10-16 RX ORDER — PROPOFOL 10 MG/ML
VIAL (ML) INTRAVENOUS AS NEEDED
Status: DISCONTINUED | OUTPATIENT
Start: 2019-10-16 | End: 2019-10-16 | Stop reason: SURG

## 2019-10-16 RX ORDER — METOCLOPRAMIDE HYDROCHLORIDE 5 MG/ML
5 INJECTION INTRAMUSCULAR; INTRAVENOUS
Status: DISCONTINUED | OUTPATIENT
Start: 2019-10-16 | End: 2019-10-16 | Stop reason: HOSPADM

## 2019-10-16 RX ORDER — SODIUM CHLORIDE 0.9 % (FLUSH) 0.9 %
3 SYRINGE (ML) INJECTION EVERY 12 HOURS SCHEDULED
Status: DISCONTINUED | OUTPATIENT
Start: 2019-10-16 | End: 2019-10-16

## 2019-10-16 RX ORDER — ROCURONIUM BROMIDE 10 MG/ML
INJECTION, SOLUTION INTRAVENOUS AS NEEDED
Status: DISCONTINUED | OUTPATIENT
Start: 2019-10-16 | End: 2019-10-16 | Stop reason: SURG

## 2019-10-16 RX ORDER — DEXAMETHASONE SODIUM PHOSPHATE 4 MG/ML
INJECTION, SOLUTION INTRA-ARTICULAR; INTRALESIONAL; INTRAMUSCULAR; INTRAVENOUS; SOFT TISSUE AS NEEDED
Status: DISCONTINUED | OUTPATIENT
Start: 2019-10-16 | End: 2019-10-16 | Stop reason: SURG

## 2019-10-16 RX ORDER — CYCLOBENZAPRINE HCL 10 MG
10 TABLET ORAL 3 TIMES DAILY PRN
Status: DISCONTINUED | OUTPATIENT
Start: 2019-10-16 | End: 2019-10-17 | Stop reason: HOSPADM

## 2019-10-16 RX ORDER — SODIUM CHLORIDE 0.9 % (FLUSH) 0.9 %
3 SYRINGE (ML) INJECTION AS NEEDED
Status: DISCONTINUED | OUTPATIENT
Start: 2019-10-16 | End: 2019-10-16

## 2019-10-16 RX ORDER — MIDAZOLAM HYDROCHLORIDE 1 MG/ML
2 INJECTION INTRAMUSCULAR; INTRAVENOUS
Status: DISCONTINUED | OUTPATIENT
Start: 2019-10-16 | End: 2019-10-16

## 2019-10-16 RX ORDER — DEXTROSE MONOHYDRATE 25 G/50ML
12.5 INJECTION, SOLUTION INTRAVENOUS AS NEEDED
Status: DISCONTINUED | OUTPATIENT
Start: 2019-10-16 | End: 2019-10-16

## 2019-10-16 RX ORDER — FLUMAZENIL 0.1 MG/ML
0.2 INJECTION INTRAVENOUS AS NEEDED
Status: DISCONTINUED | OUTPATIENT
Start: 2019-10-16 | End: 2019-10-16 | Stop reason: HOSPADM

## 2019-10-16 RX ORDER — ONDANSETRON 2 MG/ML
INJECTION INTRAMUSCULAR; INTRAVENOUS AS NEEDED
Status: DISCONTINUED | OUTPATIENT
Start: 2019-10-16 | End: 2019-10-16 | Stop reason: SURG

## 2019-10-16 RX ADMIN — SUFENTANIL CITRATE 20 MCG: 50 INJECTION EPIDURAL; INTRAVENOUS at 14:27

## 2019-10-16 RX ADMIN — KETAMINE HYDROCHLORIDE 25 MG: 50 INJECTION, SOLUTION INTRAMUSCULAR; INTRAVENOUS at 15:45

## 2019-10-16 RX ADMIN — DEXAMETHASONE SODIUM PHOSPHATE 4 MG: 4 INJECTION, SOLUTION INTRAMUSCULAR; INTRAVENOUS at 17:37

## 2019-10-16 RX ADMIN — ROCURONIUM BROMIDE 5 MG: 10 INJECTION INTRAVENOUS at 14:27

## 2019-10-16 RX ADMIN — MORPHINE SULFATE 2 MG: 2 INJECTION, SOLUTION INTRAMUSCULAR; INTRAVENOUS at 19:24

## 2019-10-16 RX ADMIN — SUFENTANIL CITRATE 30 MCG: 50 INJECTION EPIDURAL; INTRAVENOUS at 14:55

## 2019-10-16 RX ADMIN — DEXAMETHASONE SODIUM PHOSPHATE 4 MG: 4 INJECTION, SOLUTION INTRAMUSCULAR; INTRAVENOUS at 12:55

## 2019-10-16 RX ADMIN — SODIUM CHLORIDE, POTASSIUM CHLORIDE, SODIUM LACTATE AND CALCIUM CHLORIDE: 600; 310; 30; 20 INJECTION, SOLUTION INTRAVENOUS at 15:35

## 2019-10-16 RX ADMIN — ACETAMINOPHEN 1000 MG: 500 TABLET, FILM COATED ORAL at 12:55

## 2019-10-16 RX ADMIN — FAMOTIDINE 20 MG: 10 INJECTION, SOLUTION INTRAVENOUS at 12:55

## 2019-10-16 RX ADMIN — HYDROMORPHONE HYDROCHLORIDE 1 MG: 1 INJECTION, SOLUTION INTRAMUSCULAR; INTRAVENOUS; SUBCUTANEOUS at 21:00

## 2019-10-16 RX ADMIN — KETAMINE HYDROCHLORIDE 25 MG: 50 INJECTION, SOLUTION INTRAMUSCULAR; INTRAVENOUS at 16:30

## 2019-10-16 RX ADMIN — METFORMIN HYDROCHLORIDE 500 MG: 500 TABLET ORAL at 22:57

## 2019-10-16 RX ADMIN — CEFAZOLIN SODIUM 1 G: 1 INJECTION, SOLUTION INTRAVENOUS at 22:57

## 2019-10-16 RX ADMIN — FENTANYL CITRATE 25 MCG: 50 INJECTION INTRAMUSCULAR; INTRAVENOUS at 19:37

## 2019-10-16 RX ADMIN — PROPOFOL 200 MCG/KG/MIN: 10 INJECTION, EMULSION INTRAVENOUS at 14:28

## 2019-10-16 RX ADMIN — KETAMINE HYDROCHLORIDE 25 MG: 50 INJECTION, SOLUTION INTRAMUSCULAR; INTRAVENOUS at 15:00

## 2019-10-16 RX ADMIN — SUFENTANIL CITRATE 20 MCG: 50 INJECTION EPIDURAL; INTRAVENOUS at 15:46

## 2019-10-16 RX ADMIN — OXYCODONE HYDROCHLORIDE AND ACETAMINOPHEN 2 TABLET: 10; 325 TABLET ORAL at 22:56

## 2019-10-16 RX ADMIN — SODIUM CHLORIDE, PRESERVATIVE FREE 3 ML: 5 INJECTION INTRAVENOUS at 22:59

## 2019-10-16 RX ADMIN — KETAMINE HYDROCHLORIDE 25 MG: 50 INJECTION, SOLUTION INTRAMUSCULAR; INTRAVENOUS at 14:27

## 2019-10-16 RX ADMIN — Medication 2 G: at 14:30

## 2019-10-16 RX ADMIN — MIDAZOLAM 2 MG: 1 INJECTION INTRAMUSCULAR; INTRAVENOUS at 12:56

## 2019-10-16 RX ADMIN — FENTANYL CITRATE 25 MCG: 50 INJECTION INTRAMUSCULAR; INTRAVENOUS at 19:35

## 2019-10-16 RX ADMIN — ONDANSETRON HYDROCHLORIDE 4 MG: 2 SOLUTION INTRAMUSCULAR; INTRAVENOUS at 17:30

## 2019-10-16 RX ADMIN — CYCLOBENZAPRINE 10 MG: 10 TABLET, FILM COATED ORAL at 21:01

## 2019-10-16 RX ADMIN — LIDOCAINE HYDROCHLORIDE 100 MG: 20 INJECTION, SOLUTION INFILTRATION; PERINEURAL at 14:27

## 2019-10-16 RX ADMIN — SODIUM CHLORIDE, POTASSIUM CHLORIDE, SODIUM LACTATE AND CALCIUM CHLORIDE 100 ML/HR: 600; 310; 30; 20 INJECTION, SOLUTION INTRAVENOUS at 09:46

## 2019-10-16 RX ADMIN — FAMOTIDINE 20 MG: 10 INJECTION, SOLUTION INTRAVENOUS at 22:57

## 2019-10-16 RX ADMIN — SODIUM CHLORIDE 75 ML/HR: 9 INJECTION, SOLUTION INTRAVENOUS at 23:20

## 2019-10-16 RX ADMIN — ONDANSETRON HYDROCHLORIDE 4 MG: 2 SOLUTION INTRAMUSCULAR; INTRAVENOUS at 20:11

## 2019-10-16 RX ADMIN — SUFENTANIL CITRATE 30 MCG: 50 INJECTION EPIDURAL; INTRAVENOUS at 15:20

## 2019-10-16 RX ADMIN — FENTANYL CITRATE 25 MCG: 50 INJECTION INTRAMUSCULAR; INTRAVENOUS at 19:30

## 2019-10-16 RX ADMIN — OXYCODONE HYDROCHLORIDE 20 MG: 20 TABLET, FILM COATED, EXTENDED RELEASE ORAL at 09:53

## 2019-10-16 RX ADMIN — PROPOFOL 200 MG: 10 INJECTION, EMULSION INTRAVENOUS at 14:27

## 2019-10-16 RX ADMIN — FENTANYL CITRATE 25 MCG: 50 INJECTION INTRAMUSCULAR; INTRAVENOUS at 19:25

## 2019-10-16 RX ADMIN — SODIUM CHLORIDE, POTASSIUM CHLORIDE, SODIUM LACTATE AND CALCIUM CHLORIDE 1000 ML: 600; 310; 30; 20 INJECTION, SOLUTION INTRAVENOUS at 09:45

## 2019-10-16 NOTE — ANESTHESIA PROCEDURE NOTES
Airway  Urgency: elective    Date/Time: 10/16/2019 2:28 PM  Airway not difficult    General Information and Staff    Patient location during procedure: OR  CRNA: Eliu Hagan CRNA    Indications and Patient Condition  Indications for airway management: airway protection    Preoxygenated: yes  MILS maintained throughout  Mask difficulty assessment: 1 - vent by mask    Final Airway Details  Final airway type: endotracheal airway      Successful airway: ETT  Cuffed: yes   Successful intubation technique: direct laryngoscopy  Endotracheal tube insertion site: oral  Blade: Adorno  Blade size: 4  ETT size (mm): 7.5  Cormack-Lehane Classification: grade I - full view of glottis  Placement verified by: chest auscultation and capnometry   Cuff volume (mL): 5  Measured from: lips  ETT/EBT  to lips (cm): 22  Number of attempts at approach: 1  Assessment: lips, teeth, and gum same as pre-op and atraumatic intubation

## 2019-10-16 NOTE — ANESTHESIA PREPROCEDURE EVALUATION
Anesthesia Evaluation     Patient summary reviewed   NPO Solid Status: > 8 hours             Airway   Mallampati: III  TM distance: >3 FB  Neck ROM: full  Dental      Pulmonary    (+) a smoker,   (-) COPD, asthma, sleep apnea  Cardiovascular   Exercise tolerance: excellent (>7 METS)    (-) pacemaker, past MI, angina, cardiac stents      Neuro/Psych  (-) seizures, TIA, CVA  GI/Hepatic/Renal/Endo    (+)   diabetes mellitus,   (-) GERD, liver disease, no renal disease    Musculoskeletal     Abdominal    Substance History      OB/GYN          Other                        Anesthesia Plan    ASA 2     general     intravenous induction   Anesthetic plan, all risks, benefits, and alternatives have been provided, discussed and informed consent has been obtained with: patient.  Use of blood products discussed with patient  Consented to blood products.
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
## 2019-10-16 NOTE — ANESTHESIA POSTPROCEDURE EVALUATION
Patient: Tobin Haile    Procedure Summary     Date:  10/16/19 Room / Location:   [facility]  /  [facility]    Anesthesia Start:  1420 Anesthesia Stop:  1812    Procedure:  ANTERIOR CERVICAL DISCECTOMY FUSION C3-7 (N/A Spine Cervical) Diagnosis:  (M54.12)    Surgeon:  LAN Pop MD Provider:  Eliu Hagan CRNA    Anesthesia Type:  general ASA Status:  2          Anesthesia Type: general  Last vitals  BP   156/80 (10/16/19 1820)   Temp   98.5 °F (36.9 °C) (10/16/19 1811)   Pulse   93 (10/16/19 1820)   Resp   16 (10/16/19 1820)     SpO2   95 % (10/16/19 1820)     Post Anesthesia Care and Evaluation    Patient location during evaluation: PACU  Patient participation: complete - patient participated  Level of consciousness: awake and alert  Pain management: adequate  Airway patency: patent  Anesthetic complications: No anesthetic complications    Cardiovascular status: acceptable  Respiratory status: acceptable  Hydration status: acceptable    Comments: Blood pressure 156/80, pulse 93, temperature 98.5 °F (36.9 °C), temperature source Temporal, resp. rate 16, SpO2 95 %.    Pt discharged from PACU based on marvin score >8

## 2019-10-17 VITALS
OXYGEN SATURATION: 95 % | RESPIRATION RATE: 18 BRPM | TEMPERATURE: 98 F | DIASTOLIC BLOOD PRESSURE: 77 MMHG | HEART RATE: 90 BPM | SYSTOLIC BLOOD PRESSURE: 130 MMHG

## 2019-10-17 LAB
ANION GAP SERPL CALCULATED.3IONS-SCNC: 12 MMOL/L (ref 5–15)
BASOPHILS # BLD AUTO: 0.05 10*3/MM3 (ref 0–0.2)
BASOPHILS NFR BLD AUTO: 0.3 % (ref 0–1.5)
BUN BLD-MCNC: 11 MG/DL (ref 6–20)
BUN/CREAT SERPL: 16.9 (ref 7–25)
CALCIUM SPEC-SCNC: 8.6 MG/DL (ref 8.6–10.5)
CHLORIDE SERPL-SCNC: 94 MMOL/L (ref 98–107)
CO2 SERPL-SCNC: 29 MMOL/L (ref 22–29)
CREAT BLD-MCNC: 0.65 MG/DL (ref 0.76–1.27)
DEPRECATED RDW RBC AUTO: 39.8 FL (ref 37–54)
EOSINOPHIL # BLD AUTO: 0 10*3/MM3 (ref 0–0.4)
EOSINOPHIL NFR BLD AUTO: 0 % (ref 0.3–6.2)
ERYTHROCYTE [DISTWIDTH] IN BLOOD BY AUTOMATED COUNT: 12.2 % (ref 12.3–15.4)
GFR SERPL CREATININE-BSD FRML MDRD: 131 ML/MIN/1.73
GLUCOSE BLD-MCNC: 248 MG/DL (ref 65–99)
HCT VFR BLD AUTO: 44.4 % (ref 37.5–51)
HGB BLD-MCNC: 15.5 G/DL (ref 13–17.7)
IMM GRANULOCYTES # BLD AUTO: 0.12 10*3/MM3 (ref 0–0.05)
IMM GRANULOCYTES NFR BLD AUTO: 0.6 % (ref 0–0.5)
LYMPHOCYTES # BLD AUTO: 0.82 10*3/MM3 (ref 0.7–3.1)
LYMPHOCYTES NFR BLD AUTO: 4.2 % (ref 19.6–45.3)
MCH RBC QN AUTO: 30.9 PG (ref 26.6–33)
MCHC RBC AUTO-ENTMCNC: 34.9 G/DL (ref 31.5–35.7)
MCV RBC AUTO: 88.4 FL (ref 79–97)
MONOCYTES # BLD AUTO: 1.73 10*3/MM3 (ref 0.1–0.9)
MONOCYTES NFR BLD AUTO: 8.8 % (ref 5–12)
NEUTROPHILS # BLD AUTO: 16.94 10*3/MM3 (ref 1.7–7)
NEUTROPHILS NFR BLD AUTO: 86.1 % (ref 42.7–76)
NRBC BLD AUTO-RTO: 0 /100 WBC (ref 0–0.2)
PLATELET # BLD AUTO: 218 10*3/MM3 (ref 140–450)
PMV BLD AUTO: 9.5 FL (ref 6–12)
POTASSIUM BLD-SCNC: 4.3 MMOL/L (ref 3.5–5.2)
RBC # BLD AUTO: 5.02 10*6/MM3 (ref 4.14–5.8)
SODIUM BLD-SCNC: 135 MMOL/L (ref 136–145)
WBC NRBC COR # BLD: 19.66 10*3/MM3 (ref 3.4–10.8)

## 2019-10-17 PROCEDURE — 97162 PT EVAL MOD COMPLEX 30 MIN: CPT

## 2019-10-17 PROCEDURE — 25010000002 ONDANSETRON PER 1 MG: Performed by: ORTHOPAEDIC SURGERY

## 2019-10-17 PROCEDURE — 97166 OT EVAL MOD COMPLEX 45 MIN: CPT

## 2019-10-17 PROCEDURE — 25010000003 CEFAZOLIN 1-4 GM/50ML-% SOLUTION: Performed by: ORTHOPAEDIC SURGERY

## 2019-10-17 PROCEDURE — 80048 BASIC METABOLIC PNL TOTAL CA: CPT | Performed by: ORTHOPAEDIC SURGERY

## 2019-10-17 PROCEDURE — 85025 COMPLETE CBC W/AUTO DIFF WBC: CPT | Performed by: ORTHOPAEDIC SURGERY

## 2019-10-17 PROCEDURE — 97535 SELF CARE MNGMENT TRAINING: CPT

## 2019-10-17 RX ADMIN — METFORMIN HYDROCHLORIDE 500 MG: 500 TABLET ORAL at 08:30

## 2019-10-17 RX ADMIN — FAMOTIDINE 20 MG: 20 TABLET, FILM COATED ORAL at 08:30

## 2019-10-17 RX ADMIN — HYDROMORPHONE HYDROCHLORIDE 1 MG: 1 INJECTION, SOLUTION INTRAMUSCULAR; INTRAVENOUS; SUBCUTANEOUS at 03:05

## 2019-10-17 RX ADMIN — ONDANSETRON HYDROCHLORIDE 4 MG: 2 SOLUTION INTRAMUSCULAR; INTRAVENOUS at 12:15

## 2019-10-17 RX ADMIN — CYCLOBENZAPRINE 10 MG: 10 TABLET, FILM COATED ORAL at 16:31

## 2019-10-17 RX ADMIN — OXYCODONE HYDROCHLORIDE AND ACETAMINOPHEN 2 TABLET: 10; 325 TABLET ORAL at 07:00

## 2019-10-17 RX ADMIN — CEFAZOLIN SODIUM 1 G: 1 INJECTION, SOLUTION INTRAVENOUS at 07:00

## 2019-10-17 NOTE — PLAN OF CARE
Problem: Patient Care Overview  Goal: Plan of Care Review   10/17/19 1014   Coping/Psychosocial   Plan of Care Reviewed With patient   Plan of Care Review   Progress improving   OTHER   Outcome Summary PT eval complete. pt performed bed mobility with supervision. pt was supervision at first with sit to stand but changed to CGA when he began to be very nausated. Before patient began feeling sick, patient was able to walk approx 20 feet to and from bathroom with CGA for safety, but could not attempt further ambulation due to nausea arising. the nausea the patient experienced severly impacted his functional ability. Recommend that pt is not discharged from hospital until nausea is under control due to the fact pt's function was severly impacted. Skilled PT is warranted to improve function and teach safety strategies related to following surigcal precautions and safely functioning at current abilities. anticipated D/C to home with home health and help from family when needed.
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
## 2019-10-17 NOTE — THERAPY EVALUATION
Acute Care - Occupational Therapy Initial Evaluation  [facility]     Patient Name: Tobin Haile  : 1970  MRN: [id number]  Today's Date: 10/17/2019  Onset of Illness/Injury or Date of Surgery: 10/16/19  Date of Referral to OT: 10/16/19  Referring Physician: Dr. Pop    Admit Date: 10/16/2019       ICD-10-CM ICD-9-CM   1. Impaired mobility Z74.09 799.89   2. Decreased activities of daily living (ADL) Z78.9 V49.89     Patient Active Problem List   Diagnosis   • Stenosis, cervical spine     Past Medical History:   Diagnosis Date   • Diabetes mellitus (CMS/HCC)      History reviewed. No pertinent surgical history.       OT ASSESSMENT FLOWSHEET (last 12 hours)      Occupational Therapy Evaluation     Row Name 10/17/19 0800                   OT Evaluation Time/Intention    Subjective Information  complains of;pain  -AC (r) LS (t) AC (c)        Document Type  evaluation  -AC (r) LS (t) AC (c)        Mode of Treatment  occupational therapy  -AC (r) LS (t) AC (c)        Patient Effort  good  -AC (r) LS (t) AC (c)        Symptoms Noted During/After Treatment  dizziness;increased pain;other (see comments) nausea  -AC (r) LS (t) AC (c)           General Information    Patient Profile Reviewed?  yes  -AC (r) LS (t) AC (c)        Onset of Illness/Injury or Date of Surgery  10/16/19  -AC (r) LS (t) AC (c)        Referring Physician  Dr. Pop  -AC (r) LS (t) AC (c)        Patient Observations  alert;cooperative;agree to therapy  -AC (r) LS (t) AC (c)        Patient/Family Observations  wife present  -AC (r) LS (t) AC (c)        General Observations of Patient  lying in fowlers, ASPEN collar present, dressing to ant. neck  -AC (r) LS (t) AC (c)        Prior Level of Function  independent:;all household mobility;community mobility;ADL's;dressing;bathing;cooking;cleaning  -AC (r) LS (t) AC (c)        Equipment Currently Used at Home  none  -AC (r) LS (t) AC (c)        Pertinent History of Current Functional Problem  Dx:  cervical stenosis s/p anterior discectomy w/ interbody fusion C3-7 (10/16/19)  -AC (r) LS (t) AC (c)        Existing Precautions/Restrictions  fall;spinal  -AC (r) LS (t) AC (c)        Risks Reviewed  patient:;spouse/S.O.:;LOB;nausea/vomiting;dizziness;increased discomfort;change in vital signs;increased drainage;lines disloged  -AC (r) LS (t) AC (c)        Benefits Reviewed  patient:;spouse/S.O.:;improve function;increase independence;increase strength;increase balance;decrease pain;decrease risk of DVT;improve skin integrity;increase knowledge  -AC (r) LS (t) AC (c)        Barriers to Rehab  none identified  -AC (r) LS (t) AC (c)           Relationship/Environment    Lives With  child(arik), adult;spouse  -AC (r) LS (t) AC (c)        Family Caregiver if Needed  child(arik), adult;spouse  -AC (r) LS (t) AC (c)           Resource/Environmental Concerns    Current Living Arrangements  home/apartment/condo  -AC (r) LS (t) AC (c)           Home Main Entrance    Number of Stairs, Main Entrance  one  -AC (r) LS (t) AC (c)        Stair Railings, Main Entrance  none  -AC (r) LS (t) AC (c)           Cognitive Assessment/Interventions    Additional Documentation  Cognitive Assessment/Intervention (Group)  -AC (r) LS (t) AC (c)           Cognitive Assessment/Intervention- PT/OT    Affect/Mental Status (Cognitive)  WNL  -AC (r) LS (t) AC (c)        Orientation Status (Cognition)  oriented x 4  -AC (r) LS (t) AC (c)        Cognitive Function (Cognitive)  WNL  -AC (r) LS (t) AC (c)        Personal Safety Interventions  elopement precautions initiated;fall prevention program maintained;gait belt;muscle strengthening facilitated;nonskid shoes/slippers when out of bed;supervised activity  -AC (r) LS (t) AC (c)           Safety Issues, Functional Mobility    Impairments Affecting Function (Mobility)  balance;endurance/activity tolerance;pain;postural/trunk control  -AC (r) LS (t) AC (c)           Bed Mobility Assessment/Treatment     Bed Mobility Assessment/Treatment  scooting/bridging;supine-sit;sit-supine  -AC (r) LS (t) AC (c)        Scooting/Bridging [place] (Bed Mobility)  supervision  -AC (r) LS (t) AC (c)        Supine-Sit [place] (Bed Mobility)  supervision  -AC (r) LS (t) AC (c)        Sit-Supine [place] (Bed Mobility)  verbal cues;contact guard  -AC (r) LS (t) AC (c)        Assistive Device (Bed Mobility)  bed rails;head of bed elevated  -AC (r) LS (t) AC (c)        Comment (Bed Mobility)  Pt c/o fatigue, dizziness, and nausea, requesting to return to supine  -AC (r) LS (t) AC (c)           Functional Mobility    Functional Mobility- Ind. Level  contact guard assist  -AC (r) LS (t) AC (c)        Functional Mobility- Safety Issues  balance decreased during turns  -AC (r) LS (t) AC (c)        Functional Mobility- Comment  to bathroom; 1 LOB and c/o dizziness upon turning   -AC (r) LS (t) AC (c)           Transfer Assessment/Treatment    Transfer Assessment/Treatment  sit-stand transfer;stand-sit transfer  -AC (r) LS (t) AC (c)           Sit-Stand Transfer    Sit-Stand [place] (Transfers)  contact guard  -AC (r) LS (t) AC (c)           Stand-Sit Transfer    Stand-Sit [place] (Transfers)  contact guard  -AC (r) LS (t) AC (c)           ADL Assessment/Intervention    BADL Assessment/Intervention  lower body dressing;grooming;toileting  -AC (r) LS (t) AC (c)           Lower Body Dressing Assessment/Training    Lower Body Dressing [place] Level  doff;don;pants/bottoms;other (see comments) SBA  -AC (r) LS (t) AC (c)        Lower Body Dressing Position  edge of bed sitting  -AC (r) LS (t) AC (c)        Comment (Lower Body Dressing)  required maxA to doff pants d/t dizziness and nausea following act  -AC (r) LS (t) AC (c)           Grooming Assessment/Training    [place] Level (Grooming)  wash face, hands;other (see comments) SBA  -AC (r) LS (t) AC (c)        Grooming Position  sink side  -AC (r) LS (t) AC (c)            Toileting Assessment/Training    [place] Level (Toileting)  toileting skills;adjust/manage clothing;contact guard assist  -AC (r) LS (t) AC (c)        Assistive Devices (Toileting)  commode  -AC (r) LS (t) AC (c)        Toileting Position  unsupported standing  -AC (r) LS (t) AC (c)           BADL Safety/Performance    Impairments, BADL Safety/Performance  balance;endurance/activity tolerance;pain;trunk/postural control  -AC (r) LS (t) AC (c)           General ROM    GENERAL ROM COMMENTS  BUE AROM WFL, unable to assess IR and ER  -AC (r) LS (t) AC (c)           MMT (Manual Muscle Testing)    General MMT Comments  MMT deferred d/t spinal precautions, good functional strength noted during bed mobility  -AC (r) LS (t) AC (c)           Motor Assessment/Interventions    Additional Documentation  Balance (Group)  -AC (r) LS (t) AC (c)           Balance    Balance  static sitting balance;static standing balance;dynamic sitting balance;dynamic standing balance  -AC (r) LS (t) AC (c)           Static Sitting Balance    Level of [place] (Unsupported Sitting, Static Balance)  contact guard assist  -AC (r) LS (t) AC (c)        Sitting Position (Unsupported Sitting, Static Balance)  sitting on edge of bed  -AC (r) LS (t) AC (c)        Comment (Unsupported Sitting, Static Balance)  Pt c/o dizziness and nausea following act, requiring occasional CGA while sitting EOB d/t post. lean; S at beginning of eval  -AC (r) LS (t) AC (c)           Dynamic Sitting Balance    Level of [place], Reaches Outside Midline (Sitting, Dynamic Balance)  contact guard assist  -AC (r) LS (t) AC (c)        Sitting Position, Reaches Outside Midline (Sitting, Dynamic Balance)  sitting on edge of bed  -AC (r) LS (t) AC (c)        Comment, Reaches Outside Midline (Sitting, Dynamic Balance)  Pt c/o dizziness and nausea following act, requiring occasional CGA while sitting EOB d/t post. lean; S at beginning of eval  -AC (r) LS (t) AC (c)            Static Standing Balance    Level of [place] (Unsupported Standing, Static Balance)  standby assist  -AC (r) LS (t) AC (c)           Dynamic Standing Balance    Level of [place], Reaches Outside Midline (Standing, Dynamic Balance)  contact guard assist  -AC (r) LS (t) AC (c)        Comment, Reaches Outside Midline (Standing, Dynamic Balance)  Pt requires occasional CGA w/ turns and reaching  -AC (r) LS (t) AC (c)           Sensory Assessment/Intervention    Sensory General Assessment  no sensation deficits identified  -AC (r) LS (t) AC (c)           Positioning and Restraints    Pre-Treatment Position  in bed  -AC (r) LS (t) AC (c)        Post Treatment Position  bed  -AC (r) LS (t) AC (c)        In Bed  notified nsg;fowlers;call light within reach;encouraged to call for assist;with other staff;side rails up x2;RUE elevated;LUE elevated;SCD pump applied;with brace  -AC (r) LS (t) AC (c)           Pain Assessment    Additional Documentation  Pain Scale: Numbers Pre/Post-Treatment (Group)  -AC (r) LS (t) AC (c)           Pain Scale: Numbers Pre/Post-Treatment    Pain Scale: Numbers, Pretreatment  6/10  -AC (r) LS (t) AC (c)        Pain Scale: Numbers, Post-Treatment  8/10  -AC (r) LS (t) AC (c)        Pain Location  neck  -AC (r) LS (t) AC (c)        Pain Intervention(s)  Medication (See MAR);Repositioned;Ambulation/increased activity  -AC (r) LS (t) AC (c)           Orthotics & Prosthetics Management    Orthosis Location  spinal orthosis  -AC (r) LS (t) AC (c)        Additional Documentation  Orthosis Location (Row)  -AC (r) LS (t) AC (c)           Spinal Orthosis Management    Type (Spinal Orthosis)  cervical collar, hard  -AC (r) LS (t) AC (c)        Fabrication Comment (Spinal Orthosis)  pre-issued, present on pt upon entry in room  -AC (r) LS (t) AC (c)        Functional Design (Spinal Orthosis)  static orthosis  -AC (r) LS (t) AC (c)        Therapeutic Indications (Spinal Orthosis)  post-op  positioning/protection  -AC (r) LS (t) AC (c)        Wearing Schedule (Spinal Orthosis)  wear full time  -AC (r) LS (t) AC (c)        Orthosis Training (Spinal Orthosis)  patient;caregiver;activity limitations/precautions;purpose/goals of orthosis;wearing schedule;unable to meet, needs instruction;other (see comments) unable to complete training d/t pt decline during eval  -AC (r) LS (t) AC (c)           Wound 10/16/19 1741 Other (See comments) neck Incision    Wound - Properties Group Date first assessed: 10/16/19  -SH Time first assessed: 1741  -SH Side: Other (See comments)  -SH Location: neck  -SH Primary Wound Type: Incision  -SH       Plan of Care Review    Plan of Care Reviewed With  patient;spouse  -AC (r) LS (t) AC (c)           Clinical Impression (OT)    Date of Referral to OT  10/16/19  -AC (r) LS (t) AC (c)        OT Diagnosis  decreased adl  -AC (r) LS (t) AC (c)        Prognosis (OT Eval)  good  -AC (r) LS (t) AC (c)        Criteria for Skilled Therapeutic Interventions Met (OT Eval)  yes;treatment indicated  -AC (r) LS (t) AC (c)        Rehab Potential (OT Eval)  good, to achieve stated therapy goals  -AC (r) LS (t) AC (c)        Therapy Frequency (OT Eval)  5 times/wk  -AC (r) LS (t) AC (c)        Predicted Duration of Therapy Intervention (Therapy Eval)  10 days  -AC (r) LS (t) AC (c)        Care Plan Review (OT)  evaluation/treatment results reviewed;care plan/treatment goals reviewed;risks/benefits reviewed;current/potential barriers reviewed;patient/other agree to care plan  -AC (r) LS (t) AC (c)        Care Plan Review, Other Participant (OT Eval)  spouse  -AC (r) LS (t) AC (c)        Anticipated Discharge Disposition (OT)  home with 24/7 care  -AC (r) LS (t) AC (c)           Vital Signs    Intra Systolic BP Rehab  140  -AC (r) LS (t) AC (c)        Intra Treatment Diastolic BP  86  -AC (r) LS (t) AC (c)        Intratreatment Heart Rate (beats/min)  106  -AC (r) LS (t) AC (c)        Pre SpO2 (%)   94  -AC (r) LS (t) AC (c)        O2 Delivery Pre Treatment  room air  -AC (r) LS (t) AC (c)        Intra SpO2 (%)  95  -AC (r) LS (t) AC (c)        O2 Delivery Intra Treatment  room air  -AC (r) LS (t) AC (c)        Post SpO2 (%)  95  -AC (r) LS (t) AC (c)        O2 Delivery Post Treatment  room air  -AC (r) LS (t) AC (c)        Pre Patient Position  Supine  -AC (r) LS (t) AC (c)        Intra Patient Position  Standing  -AC (r) LS (t) AC (c)        Post Patient Position  Supine  -AC (r) LS (t) AC (c)           Planned OT Interventions    Planned Therapy Interventions (OT Eval)  activity tolerance training;BADL retraining;functional balance retraining;occupation/activity based interventions;orthotic fabrication/fitting/training;patient/caregiver education/training;transfer/mobility retraining  -AC (r) LS (t) AC (c)           OT Goals    Transfer Goal Selection (OT)  transfer, OT goal 1  -AC (r) LS (t) AC (c)        Dressing Goal Selection (OT)  --  -AC (r) LS (t) AC (c)           Transfer Goal 1 (OT)    Activity/Assistive Device (Transfer Goal 1, OT)  tub  -AC (r) LS (t) AC (c)        [place] Level/Cues Needed (Transfer Goal 1, OT)  standby assist  -AC (r) LS (t) AC (c)        Time Frame (Transfer Goal 1, OT)  long term goal (LTG);10 days  -AC (r) LS (t) AC (c)        Progress/Outcome (Transfer Goal 1, OT)  goal ongoing  -AC (r) LS (t) AC (c)           Patient Education Goal (OT)    Activity (Patient Education Goal, OT)  ASPEN collar wear/care  -AC (r) LS (t) AC (c)        [place]/Cues/Accuracy (Memory Goal 2, OT)  demonstrates adequately;independent;verbalizes understanding  -AC (r) LS (t) AC (c)        Time Frame (Patient Education Goal, OT)  long term goal (LTG);10 days  -AC (r) LS (t) AC (c)        Progress/Outcome (Patient Education Goal, OT)  goal ongoing  -AC (r) LS (t) AC (c)           Living Environment    Home Accessibility  stairs to enter home  -AC (r) LS (t) AC (c)          User Key  (r) =  Recorded By, (t) = Taken By, (c) = Cosigned By    Initials Name Effective Dates    GUME Vanessa Omid BRIAN, OTR/L, CNT 04/09/19 -     Cortney Mccabe RN 01/02/19 -      SharerSadaf OT Student 08/14/19 -          Occupational Therapy Education     Title: PT OT SLP Therapies (Done)     Topic: Occupational Therapy (Done)     Point: ADL training (Done)     Description: Instruct learner(s) on proper safety adaptation and remediation techniques during self care or transfers.   Instruct in proper use of assistive devices.    Learning Progress Summary           Patient Acceptance, E, VU,DU,NR by  at 10/17/2019  9:43 AM    Comment:  ASPEN collar wearing schedule, spinal precautions, ADL techniques, role of OT, OT POC   Family Acceptance, E, VU,DU,NR by  at 10/17/2019  9:43 AM    Comment:  ASPEN collar wearing schedule, spinal precautions, ADL techniques, role of OT, OT POC                   Point: Precautions (Done)     Description: Instruct learner(s) on prescribed precautions during self-care and functional transfers.    Learning Progress Summary           Patient Acceptance, E, VU,DU,NR by  at 10/17/2019  9:43 AM    Comment:  ASPEN collar wearing schedule, spinal precautions, ADL techniques, role of OT, OT POC   Family Acceptance, E, VU,DU,NR by  at 10/17/2019  9:43 AM    Comment:  ASPEN collar wearing schedule, spinal precautions, ADL techniques, role of OT, OT POC                   Point: Body mechanics (Done)     Description: Instruct learner(s) on proper positioning and spine alignment during self-care, functional mobility activities and/or exercises.    Learning Progress Summary           Patient Acceptance, E, VU,DU,NR by  at 10/17/2019  9:43 AM    Comment:  ASPEN collar wearing schedule, spinal precautions, ADL techniques, role of OT, OT POC   Family Acceptance, E, VU,DU,NR by  at 10/17/2019  9:43 AM    Comment:  ASPEN collar wearing schedule, spinal precautions, ADL techniques, role of OT, OT POC                                User Key     Initials Effective Dates Name Provider Type Discipline     08/14/19 -  Sadfa Gonzalez OT Student OT Student OT                  OT Recommendation and Plan  Outcome Summary/Treatment Plan (OT)  Anticipated Discharge Disposition (OT): home with 24/7 care  Planned Therapy Interventions (OT Eval): activity tolerance training, BADL retraining, functional balance retraining, occupation/activity based interventions, orthotic fabrication/fitting/training, patient/caregiver education/training, transfer/mobility retraining  Therapy Frequency (OT Eval): 5 times/wk  Plan of Care Review  Plan of Care Reviewed With: patient, spouse  Plan of Care Reviewed With: patient, spouse  Outcome Summary: OT eval completed. [place] [facility] [place]. Pt completed scooting and sup>sit w/ S. Pt donned pants w/ SBA while seated EOB. Pt walked to bathroom w/ CGA d/t 1 LOB and unsteadiness during turns. Pt c/o dizziness when turning too fast, encouraged to slow down when walking. Pt completed toileting w/ CGA while standing. Pt washed hands w/ SBA d/t unsteadiness w/ reaching. Pt returned to bed d/t dizziness, nausea, and fatigue, likely d/t medications given prior to eval. Pt required occasional CGA while sitting EOB after onset of symptoms d/t posterior lean. Pt's decreased balance and act geo/endurance, as well as increased pain affect his ability to perform ADLs, T/Fs, and fxl mobility. Pt's sudden onset of dizziness, nausea, and fatigue affected his performance during eval. Full eval and education re: ASPEN collar wear/care not completed d/t onset of new sypmtoms. Education to be given and reinforced w/ pt and caregivers. Skilled OT warranted to continue evaluating performance, address barriers to I, and provide required education. Recommend that pt remains in hospital until able to demo understanding of ASPEN collar wear/care and symptoms that are affecting his performance subside  during act.     Outcome Measures     Row Name 10/17/19 0820             How much help from another is currently needed...    Putting on and taking off regular lower body clothing?  3  -AC (r) LS (t) AC (c)      Bathing (including washing, rinsing, and drying)  3  -AC (r) LS (t) AC (c)      Toileting (which includes using toilet bed pan or urinal)  3  -AC (r) LS (t) AC (c)      Putting on and taking off regular upper body clothing  4  -AC (r) LS (t) AC (c)      Taking care of personal grooming (such as brushing teeth)  4  -AC (r) LS (t) AC (c)      Eating meals  4  -AC (r) LS (t) AC (c)      AM-PAC 6 Clicks Score (OT)  21  -MS (r) LS (t)         Functional Assessment    Outcome Measure Options  AM-PAC 6 Clicks Daily Activity (OT)  -AC (r) LS (t) AC (c)        User Key  (r) = Recorded By, (t) = Taken By, (c) = Cosigned By    Initials Name Provider Type    Omid Qureshi, OTR/L, CNT Occupational Therapist    Anahi Gay, PT, DPT, NCS Physical Therapist    Sadaf Rajput, OT Student OT Student          Time Calculation:   Time Calculation- OT     Row Name 10/17/19 0944             Time Calculation- OT    OT Start Time  0810 10 min chart review  -AC (r) LS (t) AC (c)      OT Stop Time  0920  -AC (r) LS (t) AC (c)      OT Time Calculation (min)  70 min  -AC (r) LS (t)      Total Timed Code Minutes- OT  10 minute(s)  -AC (r) LS (t) AC (c)      OT Received On  10/17/19  -AC (r) LS (t) AC (c)      OT Goal Re-Cert Due Date  10/27/19  -AC (r) LS (t) AC (c)        User Key  (r) = Recorded By, (t) = Taken By, (c) = Cosigned By    Initials Name Provider Type    Omid Qureshi, OTR/L, CNT Occupational Therapist    Sadaf Rajput, OT Student OT Student        Therapy Charges for Today     Code Description Service Date Service Provider Modifiers Qty    96831206358 HC OT EVAL MOD COMPLEXITY 4 10/17/2019 Sharer, EMPERATRIZ [place] Student GO 1    98986523221 HC OT SELF CARE/MGMT/TRAIN EA 15 MIN 10/17/2019 Sharer,  Sadaf, OT Student GO 1               Sadaf Gonzalez, OT Student  10/17/2019

## 2019-10-17 NOTE — PROGRESS NOTES
Continued Stay Note  [facility]     Patient Name: Tobin Haile  MRN: [id number]  Today's Date: 10/17/2019    Admit Date: 10/16/2019    Discharge Plan     Row Name 10/17/19 1236       Plan    Plan Comments  SPOKE TO [facility] ([employer]) [phone] AND WAS INFORMED THAT UTILIZATION REVIEW WOULD HAVE TO APPROVE ORDER FOR HOSPITAL BED THEN ORDER WOULD BE SENT TO [facility]. FAXED ORDER AND SUPPORTING DOCUMENTS TO  ([phone] FAX). WAS INFORMED THAT  WOULD CALL  AFTER DECISION MADE.    Row Name 10/17/19 1108       Plan    Plan Comments  PT IS [facility]. RECEIVED ORDER FOR HOSPITAL BED. SPOKE TO WIFE WHO PROVIDED  WITH [employer]  NAME AND CONTACT NUMBER. WIFE DOES NOT KNOW CLAIM NUMBER. CONTACTED CAMMIE ISHAAN ([phone]) BUT SHE STATES THE  HANDLING CASE IS APRIL [employer]. CONTACTED APRIL [phone] BUT HAD TO LEAVE A VOICE MESSAGE. [employer] WILL HAVE TO APPROVE HOSPITAL BED AND WILL ORDER AFTER APPROVAL. AWAIT RETURN CALL FROM APRIL ()        Discharge Codes    No documentation.       Expected Discharge Date and Time     Expected Discharge Date Expected Discharge Time    Oct 17, 2019             INNA Garcia

## 2019-10-17 NOTE — PLAN OF CARE
Problem: Patient Care Overview  Goal: Plan of Care Review  Outcome: Ongoing (interventions implemented as appropriate)   10/17/19 0578   Coping/Psychosocial   Plan of Care Reviewed With patient   Plan of Care Review   Progress no change   OTHER   Outcome Summary patient c/o neck pain, cervical collar in place, PO and IV pain meds given, post of IV ABX given, immeasurable amount of bloody drainage in JOYCE drain, anterior neck dressing dry and intact, neck swollen but soft, c/o difficulty swallowing but has been able to drink water and swallow meds crushed in applesauce, has been able to rest with pain meds, continuous pulse ox in place, sats in high 90s on 3L, wife at bedside, urinating without difficulty       Problem: Pain, Chronic (Adult)  Goal: Identify Related Risk Factors and Signs and Symptoms  Outcome: Ongoing (interventions implemented as appropriate)    Goal: Acceptable Pain/Comfort Level and Functional Ability  Outcome: Ongoing (interventions implemented as appropriate)      Problem: Laminectomy/Foraminotomy/Discectomy (Adult)  Goal: Signs and Symptoms of Listed Potential Problems Will be Absent, Minimized or Managed (Laminectomy/Foraminotomy/Discectomy)  Outcome: Ongoing (interventions implemented as appropriate)

## 2019-10-17 NOTE — THERAPY TREATMENT NOTE
Acute Care - Occupational Therapy Treatment Note  [facility]     Patient Name: Tobin Haile  : 1970  MRN: [id number]  Today's Date: 10/17/2019  Onset of Illness/Injury or Date of Surgery: 10/16/19  Date of Referral to OT: 10/16/19  Referring Physician: Dr. Pop    Admit Date: 10/16/2019       ICD-10-CM ICD-9-CM   1. Impaired mobility Z74.09 799.89   2. Decreased activities of daily living (ADL) Z78.9 V49.89     Patient Active Problem List   Diagnosis   • Stenosis, cervical spine     Past Medical History:   Diagnosis Date   • Diabetes mellitus (CMS/HCC)      History reviewed. No pertinent surgical history.    Therapy Treatment    Rehabilitation Treatment Summary     Row Name 10/17/19 145             Treatment Time/Intention    Discipline  occupational therapist  -AC,LS,AC2      Document Type  therapy note (daily note)  -AC,LS,AC2      Mode of Treatment  occupational therapy  -AC,LS,AC2      Patient/Family Observations  family present  -AC,LS,AC2      Care Plan Review  evaluation/treatment results reviewed;care plan/treatment goals reviewed;risks/benefits reviewed;current/potential barriers reviewed;patient/other agree to care plan  -AC,LS,AC2      Care Plan Review, Other Participant(s)  family  -AC,LS,AC2      Patient Effort  good  -AC,LS,AC2      Recorded by [AC,LS,AC2] Omid Mendez, OTR/L, CNT (r) Sadaf Gonzalez, OT Student (t) Omid Mendez, OTR/L, CNT (c) 10/17/19 1534      Row Name 10/17/19 145             Cognitive Assessment/Intervention- PT/OT    Affect/Mental Status (Cognitive)  WNL  -AC,LS,AC2      Recorded by [AC,LS,AC2] Omid Mendez, OTR/L, CNT (r) Sadaf Gonzalez, EMPERATRIZ Student (t) Omid Mendez, OTR/L, CNT (c) 10/17/19 1534      Row Name 10/17/19 1450             Bed Mobility Assessment/Treatment    Bed Mobility Assessment/Treatment  scooting/bridging;supine-sit;sit-supine  -AC,LS,AC2      Scooting/Bridging [place] (Bed Mobility)  other (see comments) SBA  -AC,LS,AC2       Supine-Sit [place] (Bed Mobility)  other (see comments) SBA  -AC,LS,AC2      Sit-Supine [place] (Bed Mobility)  other (see comments) SBA  -AC,LS,AC2      Assistive Device (Bed Mobility)  head of bed elevated;bed rails  -AC,LS,AC2      Recorded by [AC,LS,AC2] Omid Mendez, OTR/L, CNT (r) Sadaf Gonzalez, OT Student (t) Omid Mendez, OTR/L, CNT (c) 10/17/19 1534      Row Name 10/17/19 1450             Functional Mobility    Functional Mobility- Ind. Level  contact guard assist  -AC,LS,AC2      Functional Mobility- Comment  in hallway and to bathroom; unsteady and uses railing and various objects for support when walking  -AC,LS,AC2      Recorded by [AC,LS,AC2] Omid Mendez, OTR/L, CNT (r) Sadaf Gonzalez, OT Student (t) Omid Mendez, OTR/L, CNT (c) 10/17/19 1534      Row Name 10/17/19 1450             Transfer Assessment/Treatment    Transfer Assessment/Treatment  sit-stand transfer;stand-sit transfer  -AC,LS,AC2      Recorded by [AC,LS,AC2] Omid Mendez, OTR/L, CNT (r) Sadaf Gonzalez, OT Student (t) Omid Mendez, OTR/L, CNT (c) 10/17/19 1534      Row Name 10/17/19 1450             Sit-Stand Transfer    Sit-Stand [place] (Transfers)  contact guard  -AC,LS,AC2      Recorded by [AC,LS,AC2] Omid Mendez, OTR/L, CNT (r) Sadaf Gonzalez, OT Student (t) Omid Mendez, OTR/L, CNT (c) 10/17/19 1534      Row Name 10/17/19 1450             Stand-Sit Transfer    Stand-Sit [place] (Transfers)  contact guard  -AC,LS,AC2      Recorded by [AC,LS,AC2] Omid Mendez, OTR/L, CNT (r) Sadaf Gonzalez OT Student (t) Omid Mendez, OTR/L, CNT (c) 10/17/19 1534      Row Name 10/17/19 1450             ADL Assessment/Intervention    BADL Assessment/Intervention  upper body dressing;toileting  -AC,LS,AC2      Recorded by [AC,LS,AC2] Omid Mendez, OTR/L, CNT (r) Sadaf Gonzalez OT Student (t) Omid Mendez, OTR/L, CNT (c) 10/17/19 1534      Row Name 10/17/19 1453              Upper Body Dressing Assessment/Training    Upper Body Dressing [place] Level  don;pull-over garment;maximum assist (25% patient effort)  -AC,LS,AC2      Upper Body Dressing Position  unsupported sitting  -AC,LS,AC2      Recorded by [AC,LS,AC2] Omid Mendez, OTR/L, CNT (r) Sadaf Gonzalez, OT Student (t) Omid Mendez, OTR/L, CNT (c) 10/17/19 1534      Row Name 10/17/19 2383             Toileting Assessment/Training    [place] Level (Toileting)  toileting skills;adjust/manage clothing;contact guard assist  -AC,LS,AC2      Assistive Devices (Toileting)  commode  -AC,LS,AC2      Toileting Position  unsupported standing  -AC,LS,AC2      Recorded by [AC,LS,AC2] Omid Mendez, OTR/L, CNT (r) Sadaf Gonzalez, OT Student (t) Omid Mendez, OTR/L, CNT (c) 10/17/19 1534      Row Name 10/17/19 1290             Positioning and Restraints    Pre-Treatment Position  in bed  -AC,LS,AC2      Post Treatment Position  bed  -AC,LS,AC2      In Bed  sitting;call light within reach;encouraged to call for assist;with family/caregiver;side rails up x3;with brace  -AC,LS,AC2      Recorded by [AC,LS,AC2] Omid Mendez, OTR/L, CNT (r) Sadaf Gonzalez, OT Student (t) Omid Mendez, OTR/L, CNT (c) 10/17/19 1534      Row Name 10/17/19 8114             Pain Assessment    Additional Documentation  Pain Scale: FACES Pre/Post-Treatment (Group)  -AC,LS,AC2      Recorded by [AC,LS,AC2] Omid Mendez, OTR/L, CNT (r) Sadaf Gonzalez, OT Student (t) Omid Mendez, OTR/L, CNT (c) 10/17/19 1534      Row Name 10/17/19 3204             Pain Scale: Numbers Pre/Post-Treatment    Pain Location  neck  -AC,LS,AC2      Pre/Post Treatment Pain Comment  pt states pain increases w/ movement; increased pain in posterior neck   -AC,LS,AC2      Pain Intervention(s)  Repositioned;Ambulation/increased activity  -AC,LS,AC2      Recorded by [AC,LS,AC2] Omid Mendez, OTR/L, CNT (r) Sadaf Gonzalez OT Student (t) Omid Mendez,  OTR/L, CNT (c) 10/17/19 1534      Row Name 10/17/19 1450             Pain Scale: FACES Pre/Post-Treatment    Pain: FACES Scale, Pretreatment  4-->hurts little more  -AC,LS,AC2      Pain: FACES Scale, Post-Treatment  6-->hurts even more  -AC,LS,AC2      Recorded by [AC,LS,AC2] Omid Mendez OTR/L, CNT (r) Sadaf Gonzalez OT Student (t) Omid Mendez, OTR/L, CNT (c) 10/17/19 1534      Row Name 10/17/19 1450             Orthotic/Prosthetic Management    Orthosis Location  spinal orthosis  -AC,LS,AC2      Recorded by [AC,LS,AC2] Omid Mendez OTR/L, CNT (r) Sadaf Gonzalez, OT Student (t) Omid Mendez, OTR/L, CNT (c) 10/17/19 1534      Row Name 10/17/19 1450             Spinal Orthosis Management    Type (Spinal Orthosis)  cervical collar, hard  -AC,LS,AC2      Fabrication Comment (Spinal Orthosis)  Pre-issued, present on pt upon entry  -AC,LS,AC2      Functional Design (Spinal Orthosis)  static orthosis  -AC,LS,AC2      Therapeutic Indications (Spinal Orthosis)  post-op positioning/protection  -AC,LS,AC2      Wearing Schedule (Spinal Orthosis)  wear full time  -AC,LS,AC2      Orthosis Training (Spinal Orthosis)  patient;caregiver;all orthosis skills;able to verbalize training  -AC,LS,AC2      Recorded by [AC,LS,AC2] Omid Mendez, OTR/L, CNT (r) Sadaf Gonzalez, EMPERATRIZ Student (t) Omid Mendez, OTR/L, CNT (c) 10/17/19 1534      Row Name                Wound 10/16/19 1741 Other (See comments) neck Incision    Wound - Properties Group Date first assessed: 10/16/19 [SH] Time first assessed: 1741 [SH] Side: Other (See comments) [SH] Location: neck [SH] Primary Wound Type: Incision [SH] Recorded by:  [SH] Cortney Salinas RN 10/16/19 1741    Row Name 10/17/19 7822             Plan of Care Review    Plan of Care Reviewed With  patient;spouse  -AC,LS,AC2      Recorded by [JAYNE DUNAWAY,AC2] Omid Mendez, OTR/L, CNT (r) DaisharSadaf OT Student (t) Omid Mendez, OTR/L, CHELSI (c) 10/17/19 2747      Row Name  10/17/19 1450             Outcome Summary/Treatment Plan (OT)    Daily Summary of Progress (OT)  progress toward functional goals is good  -AC,LS,AC2      Recorded by [AC,LS,AC2] Omid Mendez, OTR/L, CNT (r) Sadaf Gonzalez, EMPERATRIZ Student (t) Omid Mendez, OTR/L, CNT (c) 10/17/19 1534        User Key  (r) = Recorded By, (t) = Taken By, (c) = Cosigned By    Initials Name Effective Dates Discipline    AC Omid Mendez, OTR/L, CNT 04/09/19 -  OT    Cortney Mccabe RN 01/02/19 -  Nurse    Sadaf Rajput, EMPERATRIZ Student 08/14/19 -  OT        Wound 10/16/19 1741 Other (See comments) neck Incision (Active)   Dressing Appearance dry;intact;no drainage 10/17/2019  8:25 AM   Closure Adhesive closure strips 10/17/2019  8:25 AM   Periwound swelling 10/17/2019  8:25 AM   Drainage Amount none 10/17/2019  8:25 AM   Dressing Care, Wound dressing changed;gauze;transparent film 10/17/2019  8:25 AM     Rehab Goal Summary     Row Name 10/17/19 1004 10/17/19 0800          Bed Mobility Goal 1 (PT)    Activity/Assistive Device (Bed Mobility Goal 1, PT)  bed mobility activities, all  -MS (r) DE (t) MS (c)  --     [place] Level/Cues Needed (Bed Mobility Goal 1, PT)  conditional independence with head of bed elevated   -MS (r) DE (t) MS (c)  --     Time Frame (Bed Mobility Goal 1, PT)  by discharge  -MS (r) DE (t) MS (c)  --     Progress/Outcomes (Bed Mobility Goal 1, PT)  continuing progress toward goal  -MS (r) DE (t) MS (c)  --        Transfer Goal 1 (PT)    Activity/Assistive Device (Transfer Goal 1, PT)  transfers, all  -MS (r) DE (t) MS (c)  --     [place] Level/Cues Needed (Transfer Goal 1, PT)  conditional independence with head of bed elevated   -MS (r) DE (t) MS (c)  --     Time Frame (Transfer Goal 1, PT)  by discharge  -MS (r) DE (t) MS (c)  --     Progress/Outcome (Transfer Goal 1, PT)  continuing progress toward goal  -MS (r) DE (t) MS (c)  --        Gait Training Goal 1 (PT)    Activity/Assistive Device  (Gait Training Goal 1, PT)  gait (walking locomotion)  -MS (r) DE (t) MS (c)  --     [place] Level (Gait Training Goal 1, PT)  supervision required  -MS (r) DE (t) MS (c)  --     Distance (Gait Goal 1, PT)  30 feet  -MS (r) DE (t) MS (c)  --     Time Frame (Gait Training Goal 1, PT)  by discharge  -MS (r) DE (t) MS (c)  --     Progress/Outcome (Gait Training Goal 1, PT)  continuing progress toward goal  -MS (r) DE (t) MS (c)  --        Patient Education Goal (PT)    Activity (Patient Education Goal, PT)  pt able to demonstrate dynamic standing balance with feet apart eyes open reaching for objects out of his base of support for 20 seconds without losing balance  -MS (r) DE (t) MS (c)  --     [place]/Cues/Accuracy (Memory Goal 2, PT)  demonstrates adequately  -MS (r) DE (t) MS (c)  --     Time Frame (Patient Education Goal, PT)  by discharge  -MS (r) DE (t) MS (c)  --     Progress/Outcome (Patient Education Goal, PT)  continuing progress toward goal  -MS (r) DE (t) MS (c)  --        Occupational Therapy Goals    Transfer Goal Selection (OT)  --  transfer, OT goal 1  -AC (r) LS (t) AC (c)     Dressing Goal Selection (OT)  --  --  -AC (r) LS (t) AC (c)        Transfer Goal 1 (OT)    Activity/Assistive Device (Transfer Goal 1, OT)  --  tub  -AC (r) LS (t) AC (c)     [place] Level/Cues Needed (Transfer Goal 1, OT)  --  standby assist  -AC (r) LS (t) AC (c)     Time Frame (Transfer Goal 1, OT)  --  long term goal (LTG);10 days  -AC (r) LS (t) AC (c)     Progress/Outcome (Transfer Goal 1, OT)  --  goal ongoing  -AC (r) LS (t) AC (c)        Patient Education Goal (OT)    Activity (Patient Education Goal, OT)  --  ASPEN collar wear/care  -AC (r) LS (t) AC (c)     [place]/Cues/Accuracy (Memory Goal 2, OT)  --  demonstrates adequately;independent;verbalizes understanding  -AC (r) LS (t) AC (c)     Time Frame (Patient Education Goal, OT)  --  long term goal (LTG);10 days  -AC (r) LS (t) AC (c)      Progress/Outcome (Patient Education Goal, OT)  --  goal ongoing  -AC (r) LS (t) AC (c)       User Key  (r) = Recorded By, (t) = Taken By, (c) = Cosigned By    Initials Name Provider Type Discipline    AC Omid Mendez N, OTR/L, CNT Occupational Therapist OT    Anahi Gay R, PT, DPT, NCS Physical Therapist PT    LS DaisharSadaf, OT Student OT Student OT    Carrillo Azul, PT Student PT Student PT        Occupational Therapy Education     Title: PT OT SLP Therapies (Done)     Topic: Occupational Therapy (Done)     Point: ADL training (Done)     Description: Instruct learner(s) on proper safety adaptation and remediation techniques during self care or transfers.   Instruct in proper use of assistive devices.    Learning Progress Summary           Patient Acceptance, E, VU,DU,NR by  at 10/17/2019  3:28 PM    Comment:  ASPEN collar wear/care, spinal precautions, ADL techniques, OT POC    Acceptance, E, VU,DU,NR by  at 10/17/2019  9:43 AM    Comment:  ASPEN collar wearing schedule, spinal precautions, ADL techniques, role of OT, OT POC   Family Acceptance, E, VU,DU,NR by LS at 10/17/2019  3:28 PM    Comment:  ASPEN collar wear/care, spinal precautions, ADL techniques, OT POC    Acceptance, E, VU,DU,NR by  at 10/17/2019  9:43 AM    Comment:  ASPEN collar wearing schedule, spinal precautions, ADL techniques, role of OT, OT POC                   Point: Precautions (Done)     Description: Instruct learner(s) on prescribed precautions during self-care and functional transfers.    Learning Progress Summary           Patient Acceptance, E, VU,DU,NR by LS at 10/17/2019  3:28 PM    Comment:  ASPEN collar wear/care, spinal precautions, ADL techniques, OT POC    Acceptance, E, VU,DU,NR by  at 10/17/2019  9:43 AM    Comment:  ASPEN collar wearing schedule, spinal precautions, ADL techniques, role of OT, OT POC   Family Acceptance, E, VU,DU,NR by  at 10/17/2019  3:28 PM    Comment:  ASPEN collar wear/care,  spinal precautions, ADL techniques, OT POC    Acceptance, E, VU,DU,NR by  at 10/17/2019  9:43 AM    Comment:  ASPEN collar wearing schedule, spinal precautions, ADL techniques, role of OT, OT POC                   Point: Body mechanics (Done)     Description: Instruct learner(s) on proper positioning and spine alignment during self-care, functional mobility activities and/or exercises.    Learning Progress Summary           Patient Acceptance, E, VU,DU,NR by  at 10/17/2019  3:28 PM    Comment:  ASPEN collar wear/care, spinal precautions, ADL techniques, OT POC    Acceptance, E, VU,DU,NR by  at 10/17/2019  9:43 AM    Comment:  ASPEN collar wearing schedule, spinal precautions, ADL techniques, role of OT, OT POC   Family Acceptance, E, VU,DU,NR by  at 10/17/2019  3:28 PM    Comment:  ASPEN collar wear/care, spinal precautions, ADL techniques, OT POC    Acceptance, E, VU,DU,NR by  at 10/17/2019  9:43 AM    Comment:  ASPEN collar wearing schedule, spinal precautions, ADL techniques, role of OT, OT POC                               User Key     Initials Effective Dates Name Provider Type Discipline     08/14/19 -  Sadaf Gonzalez OT Student OT Student OT                OT Recommendation and Plan  Outcome Summary/Treatment Plan (OT)  Daily Summary of Progress (OT): progress toward functional goals is good  Anticipated Discharge Disposition (OT): home with 24/7 care  Planned Therapy Interventions (OT Eval): activity tolerance training, BADL retraining, functional balance retraining, occupation/activity based interventions, orthotic fabrication/fitting/training, patient/caregiver education/training, transfer/mobility retraining  Therapy Frequency (OT Eval): 5 times/wk  Daily Summary of Progress (OT): progress toward functional goals is good  Plan of Care Review  Plan of Care Reviewed With: patient, spouse  Plan of Care Reviewed With: patient, spouse  Outcome Summary: OT tx completed. Pt completed bed mob w/  SBA. Pt required maxA to don shirt. Pt walked in hallway w/ CGA. Pt is unsteady and uses railings and various objects in hallway for assistance. Pt educated on risk of using objects for support when walking. Pt completed toileting w/ CGA while standing. Pt c/o pain in posterior neck and head with movement. Pt and spouse educated on ASPEN collar wear/care, spinal precautions, and ADL techniques. Continue OT to reinforce education and address barriers to I w/ ADLs, fxl mob, bed mob, and T/Fs.   Outcome Measures     Row Name 10/17/19 1450 10/17/19 2942          How much help from another is currently needed...    Putting on and taking off regular lower body clothing?  3  -AC (r) LS (t) AC (c)  3  -AC (r) LS (t) AC (c)     Bathing (including washing, rinsing, and drying)  3  -AC (r) LS (t) AC (c)  3  -AC (r) LS (t) AC (c)     Toileting (which includes using toilet bed pan or urinal)  3  -AC (r) LS (t) AC (c)  3  -AC (r) LS (t) AC (c)     Putting on and taking off regular upper body clothing  2  -AC (r) LS (t) AC (c)  4  -AC (r) LS (t) AC (c)     Taking care of personal grooming (such as brushing teeth)  4  -AC (r) LS (t) AC (c)  4  -AC (r) LS (t) AC (c)     Eating meals  4  -AC (r) LS (t) AC (c)  4  -AC (r) LS (t) AC (c)     AM-PAC 6 Clicks Score (OT)  19  -AC (r) LS (t)  21  -MS (r) LS (t)        Functional Assessment    Outcome Measure Options  AM-PAC 6 Clicks Daily Activity (OT)  -AC (r) LS (t) AC (c)  AM-PAC 6 Clicks Daily Activity (OT)  -AC (r) LS (t) AC (c)       User Key  (r) = Recorded By, (t) = Taken By, (c) = Cosigned By    Initials Name Provider Type    Omid Qureshi, OTR/L, CNT Occupational Therapist    Anahi Gay R, PT, DPT, NCS Physical Therapist    LS Sadaf Gonzalez, OT Student OT Student           Time Calculation:   Time Calculation- OT     Row Name 10/17/19 1529 10/17/19 0944          Time Calculation- OT    OT Start Time  1450  -AC (r) LS (t) AC (c)  0810 10 min chart review  -AC (r) LS  (t) AC (c)     OT Stop Time  1520  -AC (r) LS (t) AC (c)  0920  -AC (r) LS (t) AC (c)     OT Time Calculation (min)  30 min  -AC (r) LS (t)  70 min  -AC (r) LS (t)     Total Timed Code Minutes- OT  30 minute(s)  -AC (r) LS (t) AC (c)  10 minute(s)  -AC (r) LS (t) AC (c)     OT Received On  10/17/19  -AC (r) LS (t) AC (c)  10/17/19  -AC (r) LS (t) AC (c)     OT Goal Re-Cert Due Date  --  10/27/19  -AC (r) LS (t) AC (c)       User Key  (r) = Recorded By, (t) = Taken By, (c) = Cosigned By    Initials Name Provider Type    AC Omid Mendez, OTR/L, CNT Occupational Therapist    LS Sadaf Gonzalez, OT Student OT Student        Therapy Charges for Today     Code Description Service Date Service Provider Modifiers Qty    94453246448 HC OT EVAL MOD COMPLEXITY 4 10/17/2019 Sadaf Gonzalez OT Student GO 1    90088901247 HC OT SELF CARE/MGMT/TRAIN EA 15 MIN 10/17/2019 Sadaf Gonzalez OT Student GO 1    53671808578 HC OT SELF CARE/MGMT/TRAIN EA 15 MIN 10/17/2019 Sadaf Gonzalez OT Student GO 2               Sadaf Gonzalez OT Student  10/17/2019

## 2019-10-17 NOTE — PLAN OF CARE
Problem: Patient Care Overview  Goal: Plan of Care Review  Outcome: Ongoing (interventions implemented as appropriate)   10/17/19 1532   Coping/Psychosocial   Plan of Care Reviewed With patient   Plan of Care Review   Progress improving   OTHER   Outcome Summary Pt A&OX4. Pt c/o pain in anterior neck. Dressing changed and JOYCE drain pulled. Dressing is CDI. Pt states he is having difficulty swallowing and sore throat. Neck is swollen but soft. MD aware. SCDS for VTE prevention. Pt educated with d/c paperwork. C-collar in place. Pt to  medications at [facility]. Safety maintained.        Problem: Pain, Chronic (Adult)  Goal: Identify Related Risk Factors and Signs and Symptoms  Outcome: Ongoing (interventions implemented as appropriate)      Problem: Laminectomy/Foraminotomy/Discectomy (Adult)  Goal: Anesthesia/Sedation Recovery  Outcome: Ongoing (interventions implemented as appropriate)

## 2019-10-17 NOTE — PROGRESS NOTES
Continued Stay Note  [facility]     Patient Name: Tobin Haile  MRN: [id number]  Today's Date: 10/17/2019    Admit Date: 10/16/2019    Discharge Plan     Row Name 10/17/19 7070       Plan    Plan Comments  STILL NO DECISION FROM  ([employer]) REGARDING APPROVAL OF HOME HOSPITAL BED. AWAITING [employer]    Row Name 10/17/19 1236       Plan    Plan Comments  SPOKE TO [employer] ([employer]) [phone] AND WAS INFORMED THAT UTILIZATION REVIEW WOULD HAVE TO APPROVE ORDER FOR HOSPITAL BED THEN ORDER WOULD BE SENT TO [employer]. FAXED ORDER AND SUPPORTING DOCUMENTS TO [facility] ([phone] FAX). WAS INFORMED THAT [facility] WOULD CALL  AFTER DECISION MADE.        Discharge Codes    No documentation.       Expected Discharge Date and Time     Expected Discharge Date Expected Discharge Time    Oct 17, 2019             INNA Garcia

## 2019-10-17 NOTE — PROGRESS NOTES
Discharge Planning Assessment  [facility]     Patient Name: Tobin Haile  MRN: [id number]  Today's Date: 10/17/2019    Admit Date: 10/16/2019    Discharge Needs Assessment     Row Name 10/17/19 1107       Living Environment    Lives With  spouse    Current Living Arrangements  home/apartment/condo    Primary Care Provided by  self    Provides Primary Care For  no one    Family Caregiver if Needed  spouse    Quality of Family Relationships  helpful;involved    Able to Return to Prior Arrangements  yes       Resource/Environmental Concerns    Resource/Environmental Concerns  none    Transportation Concerns  car, none       Transition Planning    Patient/Family Anticipates Transition to  home with family    Patient/Family Anticipated Services at Transition  durable medical equipment    Transportation Anticipated  family or friend will provide       Discharge Needs Assessment    Readmission Within the Last 30 Days  no previous admission in last 30 days    Concerns to be Addressed  denies needs/concerns at this time    Equipment Currently Used at Home  none    Anticipated Changes Related to Illness  none    Equipment Needed After Discharge  hospital bed    Discharge Coordination/Progress  WORKING ON ORDER FOR HOSPITAL BED THRU [employer]         Discharge Plan     Row Name 10/17/19 1108       Plan    Plan Comments  PT IS [employer]. RECEIVED ORDER FOR HOSPITAL BED. SPOKE TO WIFE WHO PROVIDED  WITH [employer]  NAME AND CONTACT NUMBER. WIFE DOES NOT KNOW CLAIM NUMBER. CONTACTED CAMMIE QUIROS ([phone]) BUT SHE STATES THE  HANDLING CASE IS APRIL ALEXIA. CONTACTED APRIL [phone] BUT HAD TO LEAVE A VOICE MESSAGE. [employer] WILL HAVE TO APPROVE HOSPITAL BED AND WILL ORDER AFTER APPROVAL. AWAIT RETURN CALL FROM APRIL ()        Destination      No service coordination in this encounter.      Durable Medical Equipment      No service coordination in this  encounter.      Dialysis/Infusion      No service coordination in this encounter.      Home Medical Care      No service coordination in this encounter.      Therapy      No service coordination in this encounter.      Community Resources      No service coordination in this encounter.        Expected Discharge Date and Time     Expected Discharge Date Expected Discharge Time    Oct 17, 2019         Demographic Summary    No documentation.       Functional Status    No documentation.       Psychosocial    No documentation.       Abuse/Neglect    No documentation.       Legal    No documentation.       Substance Abuse    No documentation.       Patient Forms    No documentation.           INNA Garcia

## 2019-10-17 NOTE — THERAPY EVALUATION
Patient Name: Tobin Haile  : 1970    MRN: [id number]                              Today's Date: 10/17/2019       Admit Date: 10/16/2019    Visit Dx:     ICD-10-CM ICD-9-CM   1. Impaired mobility Z74.09 799.89   2. Decreased activities of daily living (ADL) Z78.9 V49.89     Patient Active Problem List   Diagnosis   • Stenosis, cervical spine     Past Medical History:   Diagnosis Date   • Diabetes mellitus (CMS/HCC)      History reviewed. No pertinent surgical history.  General Information     Row Name 10/17/19 0917          PT Evaluation Time/Intention    Document Type  evaluation pt presents with C3-C7 anterior cervical discectomy with interbody fusion to adress hx of stenosis with L shoulder and arm radiculopathy with noted weakness in L arm and L shoulder.    -MS (r) DE (t) MS (c)     Mode of Treatment  co-treatment;physical therapy  -MS (r) DE (t) MS (c)     Row Name 10/17/19 0917          General Information    Patient Profile Reviewed?  yes  -MS (r) DE (t) MS (c)     Prior Level of Function  independent:;all household mobility;community mobility;gait;ADL's;feeding;grooming;dressing;bathing;home management;cooking;cleaning;driving  -MS (r) DE (t) MS (c)     Existing Precautions/Restrictions  fall;other (see comments);spinal no bending, no lifting over 5 lbs, no twisting  -MS (r) DE (t) MS (c)     Barriers to Rehab  none identified  -MS (r) DE (t) MS (c)     Row Name 10/17/19 0917          Relationship/Environment    Lives With  spouse  -MS (r) DE (t) MS (c)     Row Name 10/17/19 0917          Resource/Environmental Concerns    Current Living Arrangements  home/apartment/condo  -MS (r) DE (t) MS (c)     Row Name 10/17/19 0917          Home Main Entrance    Number of Stairs, Main Entrance  one  -MS (r) DE (t) MS (c)     Stair Railings, Main Entrance  none  -MS (r) DE (t) MS (c)     Row Name 10/17/19 0917          Stairs Within Home, Primary    Stairs, Within Home, Primary  patient has a second floor but  will only be living on his first floor which has everything he needs.  -MS (r) DE (t) MS (c)     Row Name 10/17/19 0917          Cognitive Assessment/Intervention- PT/OT    Orientation Status (Cognition)  oriented x 4  -MS (r) DE (t) MS (c)     Cognitive Assessment/Intervention Comment  patient was alert and oriented and was very cooperative.   -MS (r) DE (t) MS (c)     Row Name 10/17/19 0992          Safety Issues, Functional Mobility    Impairments Affecting Function (Mobility)  endurance/activity tolerance;pain;range of motion (ROM);strength;other (see comments);balance patient felt sick to his stomach with activty which affected his function   -MS (r) DE (t) MS (c)       User Key  (r) = Recorded By, (t) = Taken By, (c) = Cosigned By    Initials Name Provider Type    MS Anahi Uriostegui R, PT, DPT, NCS Physical Therapist    Carrillo Azul, PT Student PT Student        Mobility     Row Name 10/17/19 0930          Bed Mobility Assessment/Treatment    Bed Mobility Assessment/Treatment  bed mobility (all) activities  -MS (r) DE (t) MS (c)     [place] Level (Bed Mobility)  supervision  -MS (r) DE (t) MS (c)     Scooting/Bridging [place] (Bed Mobility)  supervision  -MS (r) DE (t) MS (c)     Supine-Sit [place] (Bed Mobility)  supervision  -MS (r) DE (t) MS (c)     Sit-Supine [place] (Bed Mobility)  contact guard    -MS (r) DE (t) MS (c)     Assistive Device (Bed Mobility)  bed rails;head of bed elevated  -MS (r) DE (t) MS (c)     Comment (Bed Mobility)  patient was supervision for safety with bed mobility. required extra time and breaks when he was nauseated  -MS (r) DE (t) MS (c)     Row Name 10/17/19 0936          Transfer Assessment/Treatment    Comment (Transfers)  pt began session supervision with sit to stand, did not complain of dizziness when first getting up and out of bed. with activity he began to experience nausea which required rest breaks and assist with sit to stand incerased to  CGA.  -MS (r) DE (t) MS (c)     Row Name 10/17/19 0933          Sit-Stand Transfer    Sit-Stand [place] (Transfers)  contact guard  -MS (r) DE (t) MS (c)     Row Name 10/17/19 0955          Gait/Stairs Assessment/Training    Gait/Stairs Assessment/Training  gait/ambulation independence  -MS (r) DE (t) MS (c)     [place] Level (Gait)  contact guard pt was a little wobbly on his feet when turning. CGA for safety  -MS (r) DE (t) MS (c)     Distance in Feet (Gait)  20 feet. pt was able to walk to bathroom navigating turns, when returning to bed he began to feel sick and felt unable to attempt further ambulation.  -MS (r) DE (t) MS (c)       User Key  (r) = Recorded By, (t) = Taken By, (c) = Cosigned By    Initials Name Provider Type    MS Uriostegui Anahi R, PT, DPT, NCS Physical Therapist    Carrillo Azul, PT Student PT Student        Obj/Interventions     Row Name 10/17/19 0949          General ROM    GENERAL ROM COMMENTS  bilateral LE WFL; bilateral UEs limited due to brace and surgery   -MS (r) DE (t) MS (c)     Row Name 10/17/19 0949          MMT (Manual Muscle Testing)    General MMT Comments  bilateral LE WFL  -MS (r) DE (t) MS (c)     Row Name 10/17/19 0925          Static Sitting Balance    Level of [place] (Unsupported Sitting, Static Balance)  supervision  -MS (r) DE (t) MS (c)     Sitting Position (Unsupported Sitting, Static Balance)  sitting on edge of bed  -MS (r) DE (t) MS (c)     Row Name 10/17/19 0949          Dynamic Sitting Balance    Level of [place], Reaches Outside Midline (Sitting, Dynamic Balance)  independent able to reach and grasp objects outside of LUIS A  -MS (r) DE (t) MS (c)     Sitting Position, Reaches Outside Midline (Sitting, Dynamic Balance)  sitting on edge of bed  -MS (r) DE (t) MS (c)     Row Name 10/17/19 0949          Static Standing Balance    Level of [place] (Supported Standing, Static Balance)  contact guard assist  -MS (r) DE (t) MS (c)     Row Name  10/17/19 0949          Dynamic Standing Balance    Level of [place], Reaches Outside Midline (Standing, Dynamic Balance)  contact guard assist pt was able to stand and safely perform bathroom duties with CGA for safety   -MS (r) DE (t) MS (c)     Row Name 10/17/19 0949          Sensory Assessment/Intervention    Sensory General Assessment  no sensation deficits identified Bilateral LEs sensation normal  -MS (r) DE (t) MS (c)       User Key  (r) = Recorded By, (t) = Taken By, (c) = Cosigned By    Initials Name Provider Type    Anahi Gay, PT, DPT, NCS Physical Therapist    Carrillo Azul, PT Student PT Student        Goals/Plan     Row Name 10/17/19 1004          Bed Mobility Goal 1 (PT)    Activity/Assistive Device (Bed Mobility Goal 1, PT)  bed mobility activities, all  -MS (r) DE (t) MS (c)     [place] Level/Cues Needed (Bed Mobility Goal 1, PT)  conditional independence with head of bed elevated   -MS (r) DE (t) MS (c)     Time Frame (Bed Mobility Goal 1, PT)  by discharge  -MS (r) DE (t) MS (c)     Progress/Outcomes (Bed Mobility Goal 1, PT)  continuing progress toward goal  -MS (r) DE (t) MS (c)     Row Name 10/17/19 1004          Transfer Goal 1 (PT)    Activity/Assistive Device (Transfer Goal 1, PT)  transfers, all  -MS (r) DE (t) MS (c)     [place] Level/Cues Needed (Transfer Goal 1, PT)  conditional independence with head of bed elevated   -MS (r) DE (t) MS (c)     Time Frame (Transfer Goal 1, PT)  by discharge  -MS (r) DE (t) MS (c)     Progress/Outcome (Transfer Goal 1, PT)  continuing progress toward goal  -MS (r) DE (t) MS (c)     Row Name 10/17/19 1004          Gait Training Goal 1 (PT)    Activity/Assistive Device (Gait Training Goal 1, PT)  gait (walking locomotion)  -MS (r) DE (t) MS (c)     [place] Level (Gait Training Goal 1, PT)  supervision required  -MS (r) DE (t) MS (c)     Distance (Gait Goal 1, PT)  30 feet  -MS (r) DE (t) MS (c)     Time Frame (Gait Training  Goal 1, PT)  by discharge  -MS (r) DE (t) MS (c)     Progress/Outcome (Gait Training Goal 1, PT)  continuing progress toward goal  -MS (r) DE (t) MS (c)     Row Name 10/17/19 1004          Patient Education Goal (PT)    Activity (Patient Education Goal, PT)  pt able to demonstrate dynamic standing balance with feet apart eyes open reaching for objects out of his base of support for 20 seconds without losing balance  -MS (r) DE (t) MS (c)     [place]/Cues/Accuracy (Memory Goal 2, PT)  demonstrates adequately  -MS (r) DE (t) MS (c)     Time Frame (Patient Education Goal, PT)  by discharge  -MS (r) DE (t) MS (c)     Progress/Outcome (Patient Education Goal, PT)  continuing progress toward goal  -MS (r) DE (t) MS (c)       User Key  (r) = Recorded By, (t) = Taken By, (c) = Cosigned By    Initials Name Provider Type    Anahi Gay, PT, DPT, NCS Physical Therapist    Carrillo Azul, PT Student PT Student        Clinical Impression     Row Name 10/17/19 6709          Pain Assessment    Additional Documentation  Pain Scale: Numbers Pre/Post-Treatment (Group)  -MS (r) DE (t) MS (c)     Row Name 10/17/19 1525          Pain Scale: Numbers Pre/Post-Treatment    Pain Scale: Numbers, Pretreatment  6/10  -MS (r) DE (t) MS (c)     Pain Scale: Numbers, Post-Treatment  8/10  -MS (r) DE (t) MS (c)     Pain Location - Orientation  generalized  -MS (r) DE (t) MS (c)     Pain Location  neck  -MS (r) DE (t) MS (c)     Pre/Post Treatment Pain Comment  patient stated pain was in neck primarily   -MS (r) DE (t) MS (c)     Pain Intervention(s)  Medication (See MAR);Repositioned  -MS (r) DE (t) MS (c)     Row Name 10/17/19 5667          Plan of Care Review    Plan of Care Reviewed With  patient;spouse  -MS (r) DE (t) MS (c)     Row Name 10/17/19 0959          Physical Therapy Clinical Impression    Patient/Family Goals Statement (PT Clinical Impression)  to be able to go home and function independently with help from wife when  necessary.  -MS (r) DE (t) MS (c)     Criteria for Skilled Interventions Met (PT Clinical Impression)  yes;treatment indicated  -MS (r) DE (t) MS (c)     Rehab Potential (PT Clinical Summary)  good, to achieve stated therapy goals  -MS (r) DE (t) MS (c)     Predicted Duration of Therapy (PT)  until discharge  -MS (r) DE (t) MS (c)     Row Name 10/17/19 0954          Vital Signs    Intra Systolic BP Rehab  140  -MS (r) DE (t) MS (c)     Intra Treatment Diastolic BP  86  -MS (r) DE (t) MS (c)     Pretreatment Heart Rate (beats/min)  103  -MS (r) DE (t) MS (c)     Intratreatment Heart Rate (beats/min)  106  -MS (r) DE (t) MS (c)     Pre SpO2 (%)  96  -MS (r) DE (t) MS (c)     O2 Delivery Pre Treatment  room air  -MS (r) DE (t) MS (c)     Intra SpO2 (%)  95  -MS (r) DE (t) MS (c)     O2 Delivery Intra Treatment  room air  -MS (r) DE (t) MS (c)     O2 Delivery Post Treatment  room air  -MS (r) DE (t) MS (c)     Pre Patient Position  Supine  -MS (r) DE (t) MS (c)     Intra Patient Position  Standing  -MS (r) DE (t) MS (c)     Post Patient Position  Supine  -MS (r) DE (t) MS (c)     Row Name 10/17/19 0954          Positioning and Restraints    Pre-Treatment Position  in bed  -MS (r) DE (t) MS (c)     Post Treatment Position  bed  -MS (r) DE (t) MS (c)     In Bed  side rails up x2;call light within reach;encouraged to call for assist;SCD pump applied;fowlers  -MS (r) DE (t) MS (c)       User Key  (r) = Recorded By, (t) = Taken By, (c) = Cosigned By    Initials Name Provider Type    Anahi Gay R, PT, DPT, NCS Physical Therapist    Carrillo Azul, PT Student PT Student        Outcome Measures     Row Name 10/17/19 1013          How much help from another person do you currently need...    Turning from your back to your side while in flat bed without using bedrails?  4  -MS (r) DE (t) MS (c)     Moving from lying on back to sitting on the side of a flat bed without bedrails?  3  -MS (r) DE (t) MS (c)     Moving to  and from a bed to a chair (including a wheelchair)?  3  -MS (r) DE (t) MS (c)     Standing up from a chair using your arms (e.g., wheelchair, bedside chair)?  4  -MS (r) DE (t) MS (c)     Climbing 3-5 steps with a railing?  2  -MS (r) DE (t) MS (c)     To walk in hospital room?  3  -MS (r) DE (t) MS (c)     AM-PAC 6 Clicks Score (PT)  19  -MS (r) DE (t)     Row Name 10/17/19 1013          Functional Assessment    Outcome Measure Options  AM-PAC 6 Clicks Basic Mobility (PT)  -MS (r) DE (t) MS (c)       User Key  (r) = Recorded By, (t) = Taken By, (c) = Cosigned By    Initials Name Provider Type    MS Uriostegui Anahi R, PT, DPT, NCS Physical Therapist    Carrillo Azul, PT Student PT Student        Physical Therapy Education     Title: PT OT SLP Therapies (Done)     Topic: Physical Therapy (Done)     Point: Mobility training (Done)     Learning Progress Summary           Patient Acceptance, DEBRA RAMIREZ at 10/17/2019 10:10 AM    Comment:  patient educated on safety strategies for functional mobility and instructed on tips for proper positioning of neck and limbs during mobility. Educated patient on precautions and strategies to follow precautions while doing functional tasks.   Family AcceptanceJAMES DU by DE at 10/17/2019 10:10 AM    Comment:  patient educated on safety strategies for functional mobility and instructed on tips for proper positioning of neck and limbs during mobility. Educated patient on precautions and strategies to follow precautions while doing functional tasks.                   Point: Body mechanics (Done)     Learning Progress Summary           Patient AcceptanceJAMES DU by DE at 10/17/2019 10:10 AM    Comment:  patient educated on safety strategies for functional mobility and instructed on tips for proper positioning of neck and limbs during mobility. Educated patient on precautions and strategies to follow precautions while doing functional tasks.   Family AcceptanceJAMES DU by DE at 10/17/2019  10:10 AM    Comment:  patient educated on safety strategies for functional mobility and instructed on tips for proper positioning of neck and limbs during mobility. Educated patient on precautions and strategies to follow precautions while doing functional tasks.                   Point: Precautions (Done)     Learning Progress Summary           Patient Acceptance, JAMES, DEBRA by DE at 10/17/2019 10:10 AM    Comment:  patient educated on safety strategies for functional mobility and instructed on tips for proper positioning of neck and limbs during mobility. Educated patient on precautions and strategies to follow precautions while doing functional tasks.   Family AcceptanceJAMES DU by DE at 10/17/2019 10:10 AM    Comment:  patient educated on safety strategies for functional mobility and instructed on tips for proper positioning of neck and limbs during mobility. Educated patient on precautions and strategies to follow precautions while doing functional tasks.                               User Key     Initials Effective Dates Name Provider Type Discipline    DE 10/10/19 -  Carrillo Cameron, PT Student PT Student PT              PT Recommendation and Plan  Planned Therapy Interventions (PT Eval): balance training, bed mobility training, gait training, patient/family education, transfer training  Outcome Summary/Treatment Plan (PT)  Anticipated Equipment Needs at Discharge (PT): gait belt, hospital bed  Anticipated Discharge Disposition (PT): home with home health  Plan of Care Reviewed With: patient  Progress: improving  Outcome Summary: PT eval complete. pt performed bed mobility with supervision. pt was supervision at first with sit to stand but changed to CGA when he began to be very nausated. Before patient began feeling sick, patient was able to walk approx 20 feet to and from bathroom with CGA for safety, but could not attempt further ambulation due to nausea arising. the nausea the patient experienced severly  impacted his functional ability. Recommend that pt is not discharged from hospital until nausea is under control due to the fact pt's function was severly impacted. Skilled PT is warranted to improve function and teach safety strategies related to following surigcal precautions and safely functioning at current abilities. anticipated D/C to home with home health and help from family when needed.      Time Calculation:   PT Charges     Row Name 10/17/19 1331             Time Calculation    Start Time  0820 7:42 to 8 am time spent chart review.   -MS (r) DE (t) MS (c)      Stop Time  0915  -MS (r) DE (t) MS (c)      Time Calculation (min)  55 min  -MS (r) DE (t)      PT Received On  10/17/19  -MS (r) DE (t) MS (c)      PT Goal Re-Cert Due Date  10/27/19  -MS (r) DE (t) MS (c)        User Key  (r) = Recorded By, (t) = Taken By, (c) = Cosigned By    Initials Name Provider Type    Anahi Gay KRISTA, PT, DPT, NCS Physical Therapist    Carrillo Azul, PT Student PT Student        Therapy Charges for Today     Code Description Service Date Service Provider Modifiers Qty    24506590515 HC PT EVAL MOD COMPLEXITY 4 10/17/2019 Carrillo Cameron, PT Student GP 1          PT G-Codes  Outcome Measure Options: (P) AM-PAC 6 Clicks Daily Activity (OT)  AM-PAC 6 Clicks Score (PT): 19  AM-PAC 6 Clicks Score (OT): (P) 19    Carrillo Cameron PT Student  10/17/2019

## 2019-10-17 NOTE — PLAN OF CARE
Problem: Patient Care Overview  Goal: Plan of Care Review   10/17/19 0950   Coping/Psychosocial   Plan of Care Reviewed With patient;spouse   Plan of Care Review   Progress no change   OTHER   Outcome Summary OT eval completed. Pt received meds from [facility] [place] entry. Pt completed scooting and sup>sit w/ S. Pt donned pants w/ SBA while seated EOB. Pt walked to bathroom w/ CGA d/t 1 LOB and unsteadiness during turns. Pt c/o dizziness when turning too fast, encouraged to slow down when walking. Pt completed toileting w/ CGA while standing. Pt washed hands w/ SBA d/t unsteadiness w/ reaching. Pt returned to bed d/t dizziness, nausea, and fatigue, likely d/t medications given prior to eval. Pt required occasional CGA while sitting EOB after onset of symptoms d/t posterior lean. Pt's decreased balance and act geo/endurance, as well as increased pain affect his ability to perform ADLs, T/Fs, and fxl mobility. Pt's sudden onset of dizziness, nausea, and fatigue affected his performance during eval. Full eval and education re: ASPEN collar wear/care not completed d/t onset of new sypmtoms. Education to be given and reinforced w/ pt and caregivers. Skilled OT warranted to continue evaluating performance, address barriers to I, and provide required education. Recommend that pt remains in hospital until able to demo understanding of ASPEN collar wear/care and symptoms that are affecting his performance subside during act.

## 2019-10-17 NOTE — PLAN OF CARE
Problem: Patient Care Overview  Goal: Plan of Care Review  Outcome: Ongoing (interventions implemented as appropriate)   10/17/19 6695   Coping/Psychosocial   Plan of Care Reviewed With patient;spouse   Plan of Care Review   Progress improving   OTHER   Outcome Summary OT tx completed. Pt completed bed mob w/ SBA. Pt required maxA to don shirt. Pt walked in hallway w/ CGA. Pt is unsteady and uses railings and various objects in hallway for assistance. Pt educated on risk of using objects for support when walking. Pt completed toileting w/ CGA while standing. Pt c/o pain in posterior neck and head with movement. Pt and spouse educated on ASPEN collar wear/care, spinal precautions, and ADL techniques. Continue OT to reinforce education and address barriers to I w/ ADLs, fxl mob, bed mob, and T/Fs.

## 2019-10-18 NOTE — THERAPY DISCHARGE NOTE
Acute Care - Occupational Therapy Discharge Summary  [facility]     Patient Name: Tobin Haile  : 1970  MRN: [id number]    Today's Date: 10/18/2019  Onset of Illness/Injury or Date of Surgery: 10/16/19    Date of Referral to OT: 10/16/19  Referring Physician: Dr. Pop      Admit Date: 10/16/2019        OT Recommendation and Plan    Visit Dx:    ICD-10-CM ICD-9-CM   1. Impaired mobility Z74.09 799.89   2. Decreased activities of daily living (ADL) Z78.9 V49.89               Rehab Goal Summary     Row Name 10/18/19 0700             Transfer Goal 1 (OT)    Activity/Assistive Device (Transfer Goal 1, OT)  tub  -TS      [place] Level/Cues Needed (Transfer Goal 1, OT)  standby assist  -TS      Time Frame (Transfer Goal 1, OT)  long term goal (LTG);10 days  -TS      Progress/Outcome (Transfer Goal 1, OT)  goal not met  -TS         Patient Education Goal (OT)    Activity (Patient Education Goal, OT)  ASPEN collar wear/care  -TS      [place]/Cues/Accuracy (Memory Goal 2, OT)  demonstrates adequately;independent;verbalizes understanding  -TS      Time Frame (Patient Education Goal, OT)  long term goal (LTG);10 days  -TS      Progress/Outcome (Patient Education Goal, OT)  goal not met  -TS        User Key  (r) = Recorded By, (t) = Taken By, (c) = Cosigned By    Initials Name Provider Type Discipline    TS Galina Horowitz, LUZ/L Occupational Therapy Assistant OT          Outcome Measures     Row Name 10/17/19 1450 10/17/19 0820          How much help from another is currently needed...    Putting on and taking off regular lower body clothing?  3  -AC (r) LS (t) AC (c)  3  -AC (r) LS (t) AC (c)     Bathing (including washing, rinsing, and drying)  3  -AC (r) LS (t) AC (c)  3  -AC (r) LS (t) AC (c)     Toileting (which includes using toilet bed pan or urinal)  3  -AC (r) LS (t) AC (c)  3  -AC (r) LS (t) AC (c)     Putting on and taking off regular upper body clothing  2  -AC (r) LS (t) AC (c)  4  -AC  (r) LS (t) AC (c)     Taking care of personal grooming (such as brushing teeth)  4  -AC (r) LS (t) AC (c)  4  -AC (r) LS (t) AC (c)     Eating meals  4  -AC (r) LS (t) AC (c)  4  -AC (r) LS (t) AC (c)     AM-PAC 6 Clicks Score (OT)  19  -AC (r) LS (t)  21  -MS (r) LS (t)        Functional Assessment    Outcome Measure Options  AM-PAC 6 Clicks Daily Activity (OT)  -AC (r) LS (t) AC (c)  AM-PAC 6 Clicks Daily Activity (OT)  -AC (r) LS (t) AC (c)       User Key  (r) = Recorded By, (t) = Taken By, (c) = Cosigned By    Initials Name Provider Type    Omid Qureshi, OTR/L, CNT Occupational Therapist    Anahi Gay, PT, DPT, NCS Physical Therapist    Sadaf Rajput, OT Student OT Student          Therapy Suggested Charges     Code   Minutes Charges    None                 OT Discharge Summary  Reason for Discharge: Discharge from facility  Outcomes Achieved: Refer to plan of care for updates on goals achieved  Discharge Destination: Home with assist      LUZ Hayes/FEMI  10/18/2019

## 2019-10-18 NOTE — THERAPY DISCHARGE NOTE
Acute Care - Physical Therapy Discharge Summary  [facility]       Patient Name: Tobin Haile  : 1970  MRN: [id number]    Today's Date: 10/18/2019  Onset of Illness/Injury or Date of Surgery: 10/16/19       Referring Physician: Dr. Pop      Admit Date: 10/16/2019      PT Recommendation and Plan    Visit Dx:    ICD-10-CM ICD-9-CM   1. Impaired mobility Z74.09 799.89   2. Decreased activities of daily living (ADL) Z78.9 V49.89       Outcome Measures     Row Name 10/17/19 1450 10/17/19 0820          How much help from another is currently needed...    Putting on and taking off regular lower body clothing?  3  -AC (r) LS (t) AC (c)  3  -AC (r) LS (t) AC (c)     Bathing (including washing, rinsing, and drying)  3  -AC (r) LS (t) AC (c)  3  -AC (r) LS (t) AC (c)     Toileting (which includes using toilet bed pan or urinal)  3  -AC (r) LS (t) AC (c)  3  -AC (r) LS (t) AC (c)     Putting on and taking off regular upper body clothing  2  -AC (r) LS (t) AC (c)  4  -AC (r) LS (t) AC (c)     Taking care of personal grooming (such as brushing teeth)  4  -AC (r) LS (t) AC (c)  4  -AC (r) LS (t) AC (c)     Eating meals  4  -AC (r) LS (t) AC (c)  4  -AC (r) LS (t) AC (c)     AM-PAC 6 Clicks Score (OT)  19  -AC (r) LS (t)  21  -MS (r) LS (t)        Functional Assessment    Outcome Measure Options  AM-PAC 6 Clicks Daily Activity (OT)  -AC (r) LS (t) AC (c)  AM-PAC 6 Clicks Daily Activity (OT)  -AC (r) LS (t) AC (c)       User Key  (r) = Recorded By, (t) = Taken By, (c) = Cosigned By    Initials Name Provider Type    Omid Qureshi, OTR/L, CNT Occupational Therapist    Anahi Gay R, PT, DPT, NCS Physical Therapist    Sadaf Rajput, OT Student OT Student              Rehab Goal Summary     Row Name 10/18/19 1558 10/18/19 0700          Bed Mobility Goal 1 (PT)    Activity/Assistive Device (Bed Mobility Goal 1, PT)  bed mobility activities, all  -NW  --     [place] Level/Cues Needed (Bed Mobility Goal  1, PT)  conditional independence with head of bed elevated   -NW  --     Time Frame (Bed Mobility Goal 1, PT)  by discharge  -NW  --     Progress/Outcomes (Bed Mobility Goal 1, PT)  goal not met  -NW  --        Transfer Goal 1 (PT)    Activity/Assistive Device (Transfer Goal 1, PT)  transfers, all  -NW  --     [place] Level/Cues Needed (Transfer Goal 1, PT)  conditional independence with head of bed elevated   -NW  --     Time Frame (Transfer Goal 1, PT)  by discharge  -NW  --     Progress/Outcome (Transfer Goal 1, PT)  goal not met  -NW  --        Gait Training Goal 1 (PT)    Activity/Assistive Device (Gait Training Goal 1, PT)  gait (walking locomotion)  -NW  --     [place] Level (Gait Training Goal 1, PT)  supervision required  -NW  --     Distance (Gait Goal 1, PT)  30 feet  -NW  --     Time Frame (Gait Training Goal 1, PT)  by discharge  -NW  --     Progress/Outcome (Gait Training Goal 1, PT)  goal not met  -NW  --        Patient Education Goal (PT)    Activity (Patient Education Goal, PT)  pt able to demonstrate dynamic standing balance with feet apart eyes open reaching for objects out of his base of support for 20 seconds without losing balance  -NW  --     [place]/Cues/Accuracy (Memory Goal 2, PT)  demonstrates adequately  -NW  --     Time Frame (Patient Education Goal, PT)  by discharge  -NW  --     Progress/Outcome (Patient Education Goal, PT)  goal not met  -NW  --        Transfer Goal 1 (OT)    Activity/Assistive Device (Transfer Goal 1, OT)  --  tub  -TS     [place] Level/Cues Needed (Transfer Goal 1, OT)  --  standby assist  -TS     Time Frame (Transfer Goal 1, OT)  --  long term goal (LTG);10 days  -TS     Progress/Outcome (Transfer Goal 1, OT)  --  goal not met  -TS        Patient Education Goal (OT)    Activity (Patient Education Goal, OT)  --  ASPEN collar wear/care  -TS     [place]/Cues/Accuracy (Memory Goal 2, OT)  --  demonstrates adequately;independent;verbalizes  understanding  -TS     Time Frame (Patient Education Goal, OT)  --  long term goal (LTG);10 days  -TS     Progress/Outcome (Patient Education Goal, OT)  --  goal not met  -TS       User Key  (r) = Recorded By, (t) = Taken By, (c) = Cosigned By    Initials Name Provider Type Discipline    TS Galina Horowitz, ESCOBAR/L Occupational Therapy Assistant OT    NW Erica Edwards, NEHAL Physical Therapy Assistant PT              PT Discharge Summary  Anticipated Discharge Disposition (PT): home  Reason for Discharge: Discharge from facility  Outcomes Achieved: Refer to plan of care for updates on goals achieved, Discharge from facility occurred on same date as evluation  Discharge Destination: Home      Erica Edwards PTA   10/18/2019

## 2019-11-06 ENCOUNTER — APPOINTMENT (OUTPATIENT)
Dept: GENERAL RADIOLOGY | Facility: HOSPITAL | Age: 49
End: 2019-11-06

## 2019-11-06 ENCOUNTER — HOSPITAL ENCOUNTER (INPATIENT)
Facility: HOSPITAL | Age: 49
LOS: 1 days | Discharge: HOME | End: 2019-11-07
Attending: ORTHOPAEDIC SURGERY | Admitting: ORTHOPAEDIC SURGERY

## 2019-11-06 ENCOUNTER — ANESTHESIA EVENT (OUTPATIENT)
Dept: PERIOP | Facility: HOSPITAL | Age: 49
End: 2019-11-06

## 2019-11-06 ENCOUNTER — ANESTHESIA (OUTPATIENT)
Dept: PERIOP | Facility: HOSPITAL | Age: 49
End: 2019-11-06

## 2019-11-06 PROBLEM — M50.30 DEGENERATIVE DISC DISEASE, CERVICAL: Status: ACTIVE | Noted: 2019-11-06

## 2019-11-06 LAB
ABO GROUP BLD: NORMAL
BLD GP AB SCN SERPL QL: NEGATIVE
GLUCOSE BLDC GLUCOMTR-MCNC: 236 MG/DL (ref 70–130)
GLUCOSE BLDC GLUCOMTR-MCNC: 265 MG/DL (ref 70–130)
RH BLD: POSITIVE
T&S EXPIRATION DATE: NORMAL

## 2019-11-06 PROCEDURE — 94799 UNLISTED PULMONARY SVC/PX: CPT

## 2019-11-06 PROCEDURE — 0RG207J FUSION OF 2 OR MORE CERVICAL VERTEBRAL JOINTS WITH AUTOLOGOUS TISSUE SUBSTITUTE, POSTERIOR APPROACH, ANTERIOR COLUMN, OPEN APPROACH: ICD-10-PCS | Performed by: ORTHOPAEDIC SURGERY

## 2019-11-06 PROCEDURE — 25010000002 METOCLOPRAMIDE PER 10 MG: Performed by: ANESTHESIOLOGY

## 2019-11-06 PROCEDURE — 63710000001 INSULIN REGULAR HUMAN PER 5 UNITS: Performed by: ANESTHESIOLOGY

## 2019-11-06 PROCEDURE — 25010000002 ONDANSETRON PER 1 MG: Performed by: ANESTHESIOLOGY

## 2019-11-06 PROCEDURE — 86850 RBC ANTIBODY SCREEN: CPT | Performed by: ORTHOPAEDIC SURGERY

## 2019-11-06 PROCEDURE — 25010000002 SUCCINYLCHOLINE PER 20 MG: Performed by: NURSE ANESTHETIST, CERTIFIED REGISTERED

## 2019-11-06 PROCEDURE — 25010000002 PROPOFOL 10 MG/ML EMULSION: Performed by: NURSE ANESTHETIST, CERTIFIED REGISTERED

## 2019-11-06 PROCEDURE — 86901 BLOOD TYPING SEROLOGIC RH(D): CPT | Performed by: ORTHOPAEDIC SURGERY

## 2019-11-06 PROCEDURE — 25010000002 NEOSTIGMINE 10 MG/10ML SOLUTION 10 ML VIAL: Performed by: NURSE ANESTHETIST, CERTIFIED REGISTERED

## 2019-11-06 PROCEDURE — 76000 FLUOROSCOPY <1 HR PHYS/QHP: CPT

## 2019-11-06 PROCEDURE — 25010000002 ONDANSETRON PER 1 MG: Performed by: NURSE ANESTHETIST, CERTIFIED REGISTERED

## 2019-11-06 PROCEDURE — 86900 BLOOD TYPING SEROLOGIC ABO: CPT | Performed by: ORTHOPAEDIC SURGERY

## 2019-11-06 PROCEDURE — 25010000003 CEFAZOLIN 1-4 GM/50ML-% SOLUTION: Performed by: ORTHOPAEDIC SURGERY

## 2019-11-06 PROCEDURE — C1713 ANCHOR/SCREW BN/BN,TIS/BN: HCPCS | Performed by: ORTHOPAEDIC SURGERY

## 2019-11-06 PROCEDURE — 82962 GLUCOSE BLOOD TEST: CPT

## 2019-11-06 PROCEDURE — 25010000002 FENTANYL CITRATE (PF) 100 MCG/2ML SOLUTION: Performed by: ANESTHESIOLOGY

## 2019-11-06 PROCEDURE — 72040 X-RAY EXAM NECK SPINE 2-3 VW: CPT

## 2019-11-06 PROCEDURE — 25010000002 ONDANSETRON PER 1 MG: Performed by: ORTHOPAEDIC SURGERY

## 2019-11-06 DEVICE — CAVUX CERVICAL CAGE IS INDICATED FOR USE IN SKELETALLY MATURE PATIENTS WITH DEGENERATIVE DISC DISEASE (DDD) OF THE CERVICAL SPINE (C3-C7) WITH ACCOMPANYING RADICULAR SYMPTOMS AT ONE DISC LEVEL.  DDD IS DEFINED AS DISCOGENIC PAIN WITH DEGENERATION OF THE DISC CONFIRMED BY PATIENT HISTORY AND RADIOGRAPHIC STUDIES.  PATIENTS SHOULD HAVE RECEIVED AT LEAST SIX WEEKS OF NON-OPERATIVE TREATMENT PRIOR TO TREATMENT WITH THE DEVICE.  DEVICES ARE INTENDED TO BE USED WITH AUTOGENOUS BONE GRAFT AND SUPPLEMENTAL FIXATION, SUCH AS AN ANTERIOR PLATING SYSTEM.
Type: IMPLANTABLE DEVICE | Status: FUNCTIONAL
Brand: CAVUX™ CERVICAL CAGE-B 5MM

## 2019-11-06 DEVICE — PASTE DBM INTERGRO DEMINRLIZD 5CC: Type: IMPLANTABLE DEVICE | Status: FUNCTIONAL

## 2019-11-06 DEVICE — THE DTRAX CERVICAL CAGE-B IS A SINGLE-USE, TITANIUM ALLOY INTERVERTEBRAL IMPLANT AVAILABLE IN VARIOUS FOOTPRINTS AND HEIGHTS. IT IS INTENDED TO BE USED IN CERVICAL SPINAL FUSION SURGERY FOR SKELETALLY MATURE PATIENTS WITH DEGENERATIVE DISC DISEASE OF THE CERVICAL SPINE WITH ACCOMPANYING RADICULAR SYMPTOMS AT ONE DISC LEVEL.
Type: IMPLANTABLE DEVICE | Status: FUNCTIONAL
Brand: CAVUX  CERVICAL CAGE-B

## 2019-11-06 DEVICE — THE ALLY BONE SCREW IS A SINGLE-USE IMPLANT MADE OF TITANIUM ALLOY AND USED IN BONE RECONSTRUCTION, OSTEOTOMY, ARTHRODESIS, JOIN FUSION, FRACTURE REPAIR AND FIXATION APPROPRIATE FOR THE SIZE OF THE DEVICE.
Type: IMPLANTABLE DEVICE | Status: FUNCTIONAL
Brand: ALLY BONE SCREW

## 2019-11-06 RX ORDER — SODIUM CHLORIDE 9 MG/ML
75 INJECTION, SOLUTION INTRAVENOUS CONTINUOUS
Status: DISCONTINUED | OUTPATIENT
Start: 2019-11-06 | End: 2019-11-06 | Stop reason: HOSPADM

## 2019-11-06 RX ORDER — ONDANSETRON 2 MG/ML
4 INJECTION INTRAMUSCULAR; INTRAVENOUS AS NEEDED
Status: DISCONTINUED | OUTPATIENT
Start: 2019-11-06 | End: 2019-11-06 | Stop reason: HOSPADM

## 2019-11-06 RX ORDER — FAMOTIDINE 10 MG/ML
20 INJECTION, SOLUTION INTRAVENOUS EVERY 12 HOURS SCHEDULED
Status: DISCONTINUED | OUTPATIENT
Start: 2019-11-06 | End: 2019-11-07 | Stop reason: HOSPADM

## 2019-11-06 RX ORDER — CYCLOBENZAPRINE HCL 5 MG
7.5 TABLET ORAL 3 TIMES DAILY PRN
Status: DISCONTINUED | OUTPATIENT
Start: 2019-11-06 | End: 2019-11-07 | Stop reason: HOSPADM

## 2019-11-06 RX ORDER — MAGNESIUM HYDROXIDE 1200 MG/15ML
LIQUID ORAL AS NEEDED
Status: DISCONTINUED | OUTPATIENT
Start: 2019-11-06 | End: 2019-11-06 | Stop reason: HOSPADM

## 2019-11-06 RX ORDER — OXYCODONE AND ACETAMINOPHEN 10; 325 MG/1; MG/1
1 TABLET ORAL EVERY 4 HOURS PRN
Status: DISCONTINUED | OUTPATIENT
Start: 2019-11-06 | End: 2019-11-07 | Stop reason: HOSPADM

## 2019-11-06 RX ORDER — NALOXONE HCL 0.4 MG/ML
0.04 VIAL (ML) INJECTION AS NEEDED
Status: DISCONTINUED | OUTPATIENT
Start: 2019-11-06 | End: 2019-11-06 | Stop reason: HOSPADM

## 2019-11-06 RX ORDER — FAMOTIDINE 20 MG/1
20 TABLET, FILM COATED ORAL EVERY 12 HOURS SCHEDULED
Status: DISCONTINUED | OUTPATIENT
Start: 2019-11-06 | End: 2019-11-07 | Stop reason: HOSPADM

## 2019-11-06 RX ORDER — FENTANYL CITRATE 50 UG/ML
25 INJECTION, SOLUTION INTRAMUSCULAR; INTRAVENOUS
Status: DISCONTINUED | OUTPATIENT
Start: 2019-11-06 | End: 2019-11-06

## 2019-11-06 RX ORDER — OXYCODONE HCL 20 MG/1
20 TABLET, FILM COATED, EXTENDED RELEASE ORAL ONCE
Status: COMPLETED | OUTPATIENT
Start: 2019-11-06 | End: 2019-11-06

## 2019-11-06 RX ORDER — OXYCODONE AND ACETAMINOPHEN 10; 325 MG/1; MG/1
1 TABLET ORAL ONCE AS NEEDED
Status: COMPLETED | OUTPATIENT
Start: 2019-11-06 | End: 2019-11-06

## 2019-11-06 RX ORDER — SODIUM CHLORIDE 0.9 % (FLUSH) 0.9 %
10 SYRINGE (ML) INJECTION AS NEEDED
Status: DISCONTINUED | OUTPATIENT
Start: 2019-11-06 | End: 2019-11-07 | Stop reason: HOSPADM

## 2019-11-06 RX ORDER — PROPOFOL 10 MG/ML
VIAL (ML) INTRAVENOUS AS NEEDED
Status: DISCONTINUED | OUTPATIENT
Start: 2019-11-06 | End: 2019-11-06 | Stop reason: SURG

## 2019-11-06 RX ORDER — SODIUM CHLORIDE 0.9 % (FLUSH) 0.9 %
3 SYRINGE (ML) INJECTION EVERY 12 HOURS SCHEDULED
Status: DISCONTINUED | OUTPATIENT
Start: 2019-11-06 | End: 2019-11-06

## 2019-11-06 RX ORDER — METOCLOPRAMIDE HYDROCHLORIDE 5 MG/ML
5 INJECTION INTRAMUSCULAR; INTRAVENOUS
Status: DISCONTINUED | OUTPATIENT
Start: 2019-11-06 | End: 2019-11-06 | Stop reason: HOSPADM

## 2019-11-06 RX ORDER — MIDAZOLAM HYDROCHLORIDE 1 MG/ML
2 INJECTION INTRAMUSCULAR; INTRAVENOUS
Status: DISCONTINUED | OUTPATIENT
Start: 2019-11-06 | End: 2019-11-06

## 2019-11-06 RX ORDER — SODIUM CHLORIDE 0.9 % (FLUSH) 0.9 %
3-10 SYRINGE (ML) INJECTION AS NEEDED
Status: DISCONTINUED | OUTPATIENT
Start: 2019-11-06 | End: 2019-11-06

## 2019-11-06 RX ORDER — LABETALOL HYDROCHLORIDE 5 MG/ML
5 INJECTION, SOLUTION INTRAVENOUS
Status: DISCONTINUED | OUTPATIENT
Start: 2019-11-06 | End: 2019-11-06 | Stop reason: HOSPADM

## 2019-11-06 RX ORDER — PHENYLEPHRINE HCL IN 0.9% NACL 0.8MG/10ML
SYRINGE (ML) INTRAVENOUS AS NEEDED
Status: DISCONTINUED | OUTPATIENT
Start: 2019-11-06 | End: 2019-11-06 | Stop reason: SURG

## 2019-11-06 RX ORDER — ONDANSETRON 2 MG/ML
INJECTION INTRAMUSCULAR; INTRAVENOUS AS NEEDED
Status: DISCONTINUED | OUTPATIENT
Start: 2019-11-06 | End: 2019-11-06 | Stop reason: SURG

## 2019-11-06 RX ORDER — METOCLOPRAMIDE HYDROCHLORIDE 5 MG/ML
5 INJECTION INTRAMUSCULAR; INTRAVENOUS ONCE AS NEEDED
Status: COMPLETED | OUTPATIENT
Start: 2019-11-06 | End: 2019-11-06

## 2019-11-06 RX ORDER — MIDAZOLAM HYDROCHLORIDE 1 MG/ML
1 INJECTION INTRAMUSCULAR; INTRAVENOUS
Status: DISCONTINUED | OUTPATIENT
Start: 2019-11-06 | End: 2019-11-06

## 2019-11-06 RX ORDER — HYDRALAZINE HYDROCHLORIDE 20 MG/ML
5 INJECTION INTRAMUSCULAR; INTRAVENOUS
Status: DISCONTINUED | OUTPATIENT
Start: 2019-11-06 | End: 2019-11-06 | Stop reason: HOSPADM

## 2019-11-06 RX ORDER — KETAMINE HYDROCHLORIDE 50 MG/ML
INJECTION, SOLUTION, CONCENTRATE INTRAMUSCULAR; INTRAVENOUS AS NEEDED
Status: DISCONTINUED | OUTPATIENT
Start: 2019-11-06 | End: 2019-11-06 | Stop reason: SURG

## 2019-11-06 RX ORDER — ONDANSETRON 2 MG/ML
4 INJECTION INTRAMUSCULAR; INTRAVENOUS EVERY 6 HOURS PRN
Status: DISCONTINUED | OUTPATIENT
Start: 2019-11-06 | End: 2019-11-06 | Stop reason: SDUPTHER

## 2019-11-06 RX ORDER — DIPHENHYDRAMINE HCL 25 MG
25 CAPSULE ORAL NIGHTLY PRN
Status: DISCONTINUED | OUTPATIENT
Start: 2019-11-06 | End: 2019-11-07 | Stop reason: HOSPADM

## 2019-11-06 RX ORDER — ONDANSETRON 4 MG/1
4 TABLET, FILM COATED ORAL EVERY 6 HOURS PRN
Status: DISCONTINUED | OUTPATIENT
Start: 2019-11-06 | End: 2019-11-07 | Stop reason: HOSPADM

## 2019-11-06 RX ORDER — MORPHINE SULFATE 2 MG/ML
2 INJECTION, SOLUTION INTRAMUSCULAR; INTRAVENOUS
Status: DISCONTINUED | OUTPATIENT
Start: 2019-11-06 | End: 2019-11-06 | Stop reason: HOSPADM

## 2019-11-06 RX ORDER — SODIUM CHLORIDE 9 MG/ML
75 INJECTION, SOLUTION INTRAVENOUS CONTINUOUS
Status: DISCONTINUED | OUTPATIENT
Start: 2019-11-06 | End: 2019-11-07 | Stop reason: HOSPADM

## 2019-11-06 RX ORDER — SUFENTANIL CITRATE 50 UG/ML
INJECTION EPIDURAL; INTRAVENOUS AS NEEDED
Status: DISCONTINUED | OUTPATIENT
Start: 2019-11-06 | End: 2019-11-06 | Stop reason: SURG

## 2019-11-06 RX ORDER — FENTANYL CITRATE 50 UG/ML
25 INJECTION, SOLUTION INTRAMUSCULAR; INTRAVENOUS AS NEEDED
Status: DISCONTINUED | OUTPATIENT
Start: 2019-11-06 | End: 2019-11-06 | Stop reason: HOSPADM

## 2019-11-06 RX ORDER — BUPIVACAINE HCL/0.9 % NACL/PF 0.1 %
2 PLASTIC BAG, INJECTION (ML) EPIDURAL ONCE
Status: COMPLETED | OUTPATIENT
Start: 2019-11-06 | End: 2019-11-06

## 2019-11-06 RX ORDER — LIDOCAINE HYDROCHLORIDE 20 MG/ML
INJECTION, SOLUTION INFILTRATION; PERINEURAL AS NEEDED
Status: DISCONTINUED | OUTPATIENT
Start: 2019-11-06 | End: 2019-11-06 | Stop reason: SURG

## 2019-11-06 RX ORDER — SODIUM CHLORIDE, SODIUM LACTATE, POTASSIUM CHLORIDE, CALCIUM CHLORIDE 600; 310; 30; 20 MG/100ML; MG/100ML; MG/100ML; MG/100ML
100 INJECTION, SOLUTION INTRAVENOUS CONTINUOUS PRN
Status: DISCONTINUED | OUTPATIENT
Start: 2019-11-06 | End: 2019-11-06

## 2019-11-06 RX ORDER — SODIUM CHLORIDE 0.9 % (FLUSH) 0.9 %
3 SYRINGE (ML) INJECTION EVERY 12 HOURS SCHEDULED
Status: DISCONTINUED | OUTPATIENT
Start: 2019-11-06 | End: 2019-11-07 | Stop reason: HOSPADM

## 2019-11-06 RX ORDER — SUCCINYLCHOLINE CHLORIDE 20 MG/ML
INJECTION INTRAMUSCULAR; INTRAVENOUS AS NEEDED
Status: DISCONTINUED | OUTPATIENT
Start: 2019-11-06 | End: 2019-11-06 | Stop reason: SURG

## 2019-11-06 RX ORDER — FLUMAZENIL 0.1 MG/ML
0.2 INJECTION INTRAVENOUS AS NEEDED
Status: DISCONTINUED | OUTPATIENT
Start: 2019-11-06 | End: 2019-11-06 | Stop reason: HOSPADM

## 2019-11-06 RX ORDER — ROCURONIUM BROMIDE 10 MG/ML
INJECTION, SOLUTION INTRAVENOUS AS NEEDED
Status: DISCONTINUED | OUTPATIENT
Start: 2019-11-06 | End: 2019-11-06 | Stop reason: SURG

## 2019-11-06 RX ORDER — IPRATROPIUM BROMIDE AND ALBUTEROL SULFATE 2.5; .5 MG/3ML; MG/3ML
3 SOLUTION RESPIRATORY (INHALATION) ONCE AS NEEDED
Status: DISCONTINUED | OUTPATIENT
Start: 2019-11-06 | End: 2019-11-06 | Stop reason: HOSPADM

## 2019-11-06 RX ORDER — SODIUM CHLORIDE, SODIUM LACTATE, POTASSIUM CHLORIDE, CALCIUM CHLORIDE 600; 310; 30; 20 MG/100ML; MG/100ML; MG/100ML; MG/100ML
100 INJECTION, SOLUTION INTRAVENOUS CONTINUOUS
Status: DISCONTINUED | OUTPATIENT
Start: 2019-11-06 | End: 2019-11-06

## 2019-11-06 RX ORDER — GLYCOPYRROLATE 0.2 MG/ML
INJECTION INTRAMUSCULAR; INTRAVENOUS AS NEEDED
Status: DISCONTINUED | OUTPATIENT
Start: 2019-11-06 | End: 2019-11-06 | Stop reason: SURG

## 2019-11-06 RX ORDER — CEFAZOLIN SODIUM 1 G/50ML
1 INJECTION, SOLUTION INTRAVENOUS EVERY 8 HOURS
Status: COMPLETED | OUTPATIENT
Start: 2019-11-06 | End: 2019-11-07

## 2019-11-06 RX ORDER — ONDANSETRON 2 MG/ML
4 INJECTION INTRAMUSCULAR; INTRAVENOUS EVERY 6 HOURS PRN
Status: DISCONTINUED | OUTPATIENT
Start: 2019-11-06 | End: 2019-11-07 | Stop reason: HOSPADM

## 2019-11-06 RX ORDER — NEOSTIGMINE METHYLSULFATE 1 MG/ML
INJECTION, SOLUTION INTRAVENOUS AS NEEDED
Status: DISCONTINUED | OUTPATIENT
Start: 2019-11-06 | End: 2019-11-06 | Stop reason: SURG

## 2019-11-06 RX ORDER — ACETAMINOPHEN 500 MG
1000 TABLET ORAL ONCE
Status: DISCONTINUED | OUTPATIENT
Start: 2019-11-06 | End: 2019-11-06

## 2019-11-06 RX ADMIN — KETAMINE HYDROCHLORIDE 25 MG: 50 INJECTION, SOLUTION INTRAMUSCULAR; INTRAVENOUS at 13:05

## 2019-11-06 RX ADMIN — KETAMINE HYDROCHLORIDE 50 MG: 50 INJECTION, SOLUTION INTRAMUSCULAR; INTRAVENOUS at 14:05

## 2019-11-06 RX ADMIN — ONDANSETRON HYDROCHLORIDE 4 MG: 2 SOLUTION INTRAMUSCULAR; INTRAVENOUS at 20:25

## 2019-11-06 RX ADMIN — ONDANSETRON HYDROCHLORIDE 4 MG: 2 SOLUTION INTRAMUSCULAR; INTRAVENOUS at 14:54

## 2019-11-06 RX ADMIN — CEFAZOLIN SODIUM 1 G: 1 INJECTION, SOLUTION INTRAVENOUS at 20:02

## 2019-11-06 RX ADMIN — Medication 2 G: at 13:16

## 2019-11-06 RX ADMIN — METFORMIN HYDROCHLORIDE 500 MG: 500 TABLET ORAL at 20:25

## 2019-11-06 RX ADMIN — ONDANSETRON HYDROCHLORIDE 4 MG: 2 SOLUTION INTRAMUSCULAR; INTRAVENOUS at 15:55

## 2019-11-06 RX ADMIN — KETAMINE HYDROCHLORIDE 25 MG: 50 INJECTION, SOLUTION INTRAMUSCULAR; INTRAVENOUS at 14:54

## 2019-11-06 RX ADMIN — OXYCODONE HYDROCHLORIDE AND ACETAMINOPHEN 1 TABLET: 10; 325 TABLET ORAL at 17:13

## 2019-11-06 RX ADMIN — SODIUM CHLORIDE, POTASSIUM CHLORIDE, SODIUM LACTATE AND CALCIUM CHLORIDE: 600; 310; 30; 20 INJECTION, SOLUTION INTRAVENOUS at 12:55

## 2019-11-06 RX ADMIN — VASOPRESSIN 0.5 ML: 20 INJECTION INTRAVENOUS at 14:38

## 2019-11-06 RX ADMIN — SODIUM CHLORIDE, POTASSIUM CHLORIDE, SODIUM LACTATE AND CALCIUM CHLORIDE: 600; 310; 30; 20 INJECTION, SOLUTION INTRAVENOUS at 14:53

## 2019-11-06 RX ADMIN — SODIUM CHLORIDE 75 ML/HR: 9 INJECTION, SOLUTION INTRAVENOUS at 20:01

## 2019-11-06 RX ADMIN — OXYCODONE HYDROCHLORIDE 20 MG: 20 TABLET, FILM COATED, EXTENDED RELEASE ORAL at 09:38

## 2019-11-06 RX ADMIN — HYDROMORPHONE HYDROCHLORIDE 1 MG: 1 INJECTION, SOLUTION INTRAMUSCULAR; INTRAVENOUS; SUBCUTANEOUS at 20:25

## 2019-11-06 RX ADMIN — INSULIN HUMAN 3 UNITS: 100 INJECTION, SOLUTION PARENTERAL at 10:46

## 2019-11-06 RX ADMIN — METOCLOPRAMIDE 5 MG: 5 INJECTION, SOLUTION INTRAMUSCULAR; INTRAVENOUS at 17:16

## 2019-11-06 RX ADMIN — SODIUM CHLORIDE, POTASSIUM CHLORIDE, SODIUM LACTATE AND CALCIUM CHLORIDE: 600; 310; 30; 20 INJECTION, SOLUTION INTRAVENOUS at 14:05

## 2019-11-06 RX ADMIN — SODIUM CHLORIDE, POTASSIUM CHLORIDE, SODIUM LACTATE AND CALCIUM CHLORIDE 100 ML/HR: 600; 310; 30; 20 INJECTION, SOLUTION INTRAVENOUS at 09:38

## 2019-11-06 RX ADMIN — LIDOCAINE HYDROCHLORIDE 100 MG: 20 INJECTION, SOLUTION INFILTRATION; PERINEURAL at 13:05

## 2019-11-06 RX ADMIN — SUFENTANIL CITRATE 30 MCG: 50 INJECTION EPIDURAL; INTRAVENOUS at 13:02

## 2019-11-06 RX ADMIN — Medication 80 MCG: at 14:38

## 2019-11-06 RX ADMIN — SUFENTANIL CITRATE 20 MCG: 50 INJECTION EPIDURAL; INTRAVENOUS at 13:59

## 2019-11-06 RX ADMIN — SUCCINYLCHOLINE CHLORIDE 160 MG: 20 INJECTION, SOLUTION INTRAMUSCULAR; INTRAVENOUS at 13:05

## 2019-11-06 RX ADMIN — PROPOFOL 150 MG: 10 INJECTION, EMULSION INTRAVENOUS at 13:05

## 2019-11-06 RX ADMIN — FAMOTIDINE 20 MG: 10 INJECTION, SOLUTION INTRAVENOUS at 20:02

## 2019-11-06 RX ADMIN — METOCLOPRAMIDE 5 MG: 5 INJECTION, SOLUTION INTRAMUSCULAR; INTRAVENOUS at 10:45

## 2019-11-06 RX ADMIN — NEOSTIGMINE METHYLSULFATE 3 MG: 1 INJECTION INTRAVENOUS at 15:02

## 2019-11-06 RX ADMIN — FENTANYL CITRATE 25 MCG: 50 INJECTION, SOLUTION INTRAMUSCULAR; INTRAVENOUS at 10:45

## 2019-11-06 RX ADMIN — Medication 160 MCG: at 14:15

## 2019-11-06 RX ADMIN — SODIUM CHLORIDE, POTASSIUM CHLORIDE, SODIUM LACTATE AND CALCIUM CHLORIDE: 600; 310; 30; 20 INJECTION, SOLUTION INTRAVENOUS at 14:10

## 2019-11-06 RX ADMIN — ROCURONIUM BROMIDE 10 MG: 10 INJECTION INTRAVENOUS at 13:05

## 2019-11-06 RX ADMIN — VASOPRESSIN 1 ML: 20 INJECTION INTRAVENOUS at 14:49

## 2019-11-06 RX ADMIN — GLYCOPYRROLATE 0.4 MG: 0.2 INJECTION, SOLUTION INTRAMUSCULAR; INTRAVENOUS at 15:02

## 2019-11-06 RX ADMIN — Medication 160 MCG: at 14:55

## 2019-11-06 RX ADMIN — Medication 160 MCG: at 13:17
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
## 2019-11-06 NOTE — ANESTHESIA PREPROCEDURE EVALUATION
Anesthesia Evaluation     Patient summary reviewed   no history of anesthetic complications:  NPO Solid Status: > 8 hours  NPO Liquid Status: > 2 hours           Airway   Mallampati: III  TM distance: >3 FB  Neck ROM: full  Dental          Pulmonary - normal exam    breath sounds clear to auscultation  (+) a smoker (quit 1 months ago) Former,   (-) COPD, asthma, recent URI, sleep apnea  Cardiovascular - normal exam  Exercise tolerance: excellent (>7 METS)    ECG reviewed  Rhythm: regular  Rate: normal    (-) pacemaker, hypertension, past MI, angina, cardiac stents      Neuro/Psych  (-) seizures, TIA, CVA  GI/Hepatic/Renal/Endo    (+)   diabetes mellitus,   (-) GERD, liver disease, no renal disease    Musculoskeletal     Abdominal    Substance History      OB/GYN          Other                        Anesthesia Plan    ASA 2     general   (Maintain in-line stabilization, avoid neck extension  Video laryngoscopy in room)  intravenous induction     Anesthetic plan, all risks, benefits, and alternatives have been provided, discussed and informed consent has been obtained with: patient.  Use of blood products discussed with patient  Consented to blood products.

## 2019-11-06 NOTE — ANESTHESIA PROCEDURE NOTES
Airway  Urgency: elective    Date/Time: 11/6/2019 1:08 PM  Airway not difficult    General Information and Staff    Patient location during procedure: OR  CRNA: John Miller CRNA    Indications and Patient Condition  Indications for airway management: airway protection    Preoxygenated: yes  Mask difficulty assessment: 1 - vent by mask    Final Airway Details  Final airway type: endotracheal airway      Successful airway: ETT  Cuffed: yes   Successful intubation technique: direct laryngoscopy  Endotracheal tube insertion site: oral  Blade: Adorno  Blade size: 4  ETT size (mm): 7.5  Cormack-Lehane Classification: grade IIa - partial view of glottis  Placement verified by: capnometry   Measured from: lips  ETT/EBT  to lips (cm): 22  Number of attempts at approach: 1  Assessment: lips, teeth, and gum same as pre-op and atraumatic intubation    Additional Comments  Intubated with C-collar in place

## 2019-11-07 VITALS
OXYGEN SATURATION: 96 % | DIASTOLIC BLOOD PRESSURE: 80 MMHG | RESPIRATION RATE: 16 BRPM | HEIGHT: 69 IN | SYSTOLIC BLOOD PRESSURE: 127 MMHG | BODY MASS INDEX: 26.81 KG/M2 | WEIGHT: 181 LBS | HEART RATE: 103 BPM | TEMPERATURE: 98.5 F

## 2019-11-07 LAB
ANION GAP SERPL CALCULATED.3IONS-SCNC: 9 MMOL/L (ref 5–15)
BASOPHILS # BLD AUTO: 0.03 10*3/MM3 (ref 0–0.2)
BASOPHILS NFR BLD AUTO: 0.3 % (ref 0–1.5)
BUN BLD-MCNC: 8 MG/DL (ref 6–20)
BUN/CREAT SERPL: 12.7 (ref 7–25)
CALCIUM SPEC-SCNC: 8.9 MG/DL (ref 8.6–10.5)
CHLORIDE SERPL-SCNC: 95 MMOL/L (ref 98–107)
CO2 SERPL-SCNC: 34 MMOL/L (ref 22–29)
CREAT BLD-MCNC: 0.63 MG/DL (ref 0.76–1.27)
DEPRECATED RDW RBC AUTO: 40.3 FL (ref 37–54)
EOSINOPHIL # BLD AUTO: 0.06 10*3/MM3 (ref 0–0.4)
EOSINOPHIL NFR BLD AUTO: 0.5 % (ref 0.3–6.2)
ERYTHROCYTE [DISTWIDTH] IN BLOOD BY AUTOMATED COUNT: 12.2 % (ref 12.3–15.4)
GFR SERPL CREATININE-BSD FRML MDRD: 136 ML/MIN/1.73
GLUCOSE BLD-MCNC: 258 MG/DL (ref 65–99)
HCT VFR BLD AUTO: 38.9 % (ref 37.5–51)
HGB BLD-MCNC: 13.3 G/DL (ref 13–17.7)
IMM GRANULOCYTES # BLD AUTO: 0.04 10*3/MM3 (ref 0–0.05)
IMM GRANULOCYTES NFR BLD AUTO: 0.4 % (ref 0–0.5)
LYMPHOCYTES # BLD AUTO: 1.21 10*3/MM3 (ref 0.7–3.1)
LYMPHOCYTES NFR BLD AUTO: 10.7 % (ref 19.6–45.3)
MCH RBC QN AUTO: 30.5 PG (ref 26.6–33)
MCHC RBC AUTO-ENTMCNC: 34.2 G/DL (ref 31.5–35.7)
MCV RBC AUTO: 89.2 FL (ref 79–97)
MONOCYTES # BLD AUTO: 1.27 10*3/MM3 (ref 0.1–0.9)
MONOCYTES NFR BLD AUTO: 11.3 % (ref 5–12)
NEUTROPHILS # BLD AUTO: 8.65 10*3/MM3 (ref 1.7–7)
NEUTROPHILS NFR BLD AUTO: 76.8 % (ref 42.7–76)
NRBC BLD AUTO-RTO: 0 /100 WBC (ref 0–0.2)
PLATELET # BLD AUTO: 306 10*3/MM3 (ref 140–450)
PMV BLD AUTO: 9 FL (ref 6–12)
POTASSIUM BLD-SCNC: 4.1 MMOL/L (ref 3.5–5.2)
RBC # BLD AUTO: 4.36 10*6/MM3 (ref 4.14–5.8)
SODIUM BLD-SCNC: 138 MMOL/L (ref 136–145)
WBC NRBC COR # BLD: 11.26 10*3/MM3 (ref 3.4–10.8)

## 2019-11-07 PROCEDURE — 97161 PT EVAL LOW COMPLEX 20 MIN: CPT | Performed by: PHYSICAL THERAPIST

## 2019-11-07 PROCEDURE — 94799 UNLISTED PULMONARY SVC/PX: CPT

## 2019-11-07 PROCEDURE — 80048 BASIC METABOLIC PNL TOTAL CA: CPT | Performed by: ORTHOPAEDIC SURGERY

## 2019-11-07 PROCEDURE — 85025 COMPLETE CBC W/AUTO DIFF WBC: CPT | Performed by: ORTHOPAEDIC SURGERY

## 2019-11-07 PROCEDURE — 25010000002 ONDANSETRON PER 1 MG: Performed by: ORTHOPAEDIC SURGERY

## 2019-11-07 PROCEDURE — 25010000003 CEFAZOLIN 1-4 GM/50ML-% SOLUTION: Performed by: ORTHOPAEDIC SURGERY

## 2019-11-07 RX ORDER — OXYCODONE AND ACETAMINOPHEN 10; 325 MG/1; MG/1
1 TABLET ORAL EVERY 4 HOURS PRN
Start: 2019-11-07 | End: 2019-11-16

## 2019-11-07 RX ADMIN — ONDANSETRON HYDROCHLORIDE 4 MG: 2 SOLUTION INTRAMUSCULAR; INTRAVENOUS at 07:44

## 2019-11-07 RX ADMIN — CEFAZOLIN SODIUM 1 G: 1 INJECTION, SOLUTION INTRAVENOUS at 04:41

## 2019-11-07 RX ADMIN — METFORMIN HYDROCHLORIDE 500 MG: 500 TABLET ORAL at 10:11

## 2019-11-07 RX ADMIN — FAMOTIDINE 20 MG: 10 INJECTION, SOLUTION INTRAVENOUS at 07:45

## 2019-11-07 RX ADMIN — CEFAZOLIN SODIUM 1 G: 1 INJECTION, SOLUTION INTRAVENOUS at 12:19

## 2019-11-07 NOTE — PLAN OF CARE
Problem: Patient Care Overview  Goal: Plan of Care Review  Outcome: Outcome(s) achieved Date Met: 11/07/19 11/07/19 0944   Coping/Psychosocial   Plan of Care Reviewed With patient   Plan of Care Review   Progress no change   OTHER   Outcome Summary PT performed eval, pt cleared for d/c home today. OT eval not warranted at this time, OT to sign off.
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
## 2019-11-07 NOTE — DISCHARGE INSTR - ACTIVITY
Activity at Discharge: Resume home activity advace as tolerated, no lifting, no twisting, no bending, brace as directed, no driving until directed.

## 2019-11-07 NOTE — PLAN OF CARE
Problem: Patient Care Overview  Goal: Plan of Care Review  Outcome: Ongoing (interventions implemented as appropriate)   11/07/19 0331   Coping/Psychosocial   Plan of Care Reviewed With patient   Plan of Care Review   Progress no change   OTHER   Outcome Summary Pt drowsy, will awake and COsevere pain. Pt CO his throat being dry and afraid he won't be able to swallow, took a while until he would take a pill. Pill did go down. Dilaudid given once, pt slept most of shift. Pt's wife asked about anterior dressing change, stating it has been in place for 3 weeks with no orders to change.        Problem: Laminectomy/Foraminotomy/Discectomy (Adult)  Goal: Signs and Symptoms of Listed Potential Problems Will be Absent, Minimized or Managed (Laminectomy/Foraminotomy/Discectomy)  Outcome: Ongoing (interventions implemented as appropriate)

## 2019-11-07 NOTE — PLAN OF CARE
Problem: Patient Care Overview  Goal: Plan of Care Review  Outcome: Ongoing (interventions implemented as appropriate)   11/07/19 1323   Coping/Psychosocial   Plan of Care Reviewed With patient   Plan of Care Review   Progress improving   OTHER   Outcome Summary PT evaluation completed. The patient demonstrates L deltoid weakness which is chronic prior to surgery and light touch impairment in the L hand. He demonstrates independence with functional mobility and understanding of the cervical collar. He will benefit from outpatient therapy for his UE weakness. Recommend discharge home with assist.

## 2019-11-07 NOTE — NURSING NOTE
Patient arrived from PACU via bed with transporter and RN. Very drowsy, stays awake and follows commands only with repeated stimuli. VSS, provided with ice chips, water pitcher, recently medicated for pain in PACU. Appears to be resting comfortably at this time with family at bedside. Continuous SPO2, dressing dry and intact, cervical collar in place, SCDs.

## 2019-11-07 NOTE — ANESTHESIA POSTPROCEDURE EVALUATION
Patient: Tobin Haile    Procedure Summary     Date:  11/06/19 Room / Location:   [facility] OR  /  [facility]    Anesthesia Start:  1300 Anesthesia Stop:  1534    Procedure:  POSTERIOR SPINAL FUSION WITH INSTRUMENTATION C3-7 (Bilateral Spine Cervical) Diagnosis:  (M54.12)    Surgeon:  LAN Pop MD Provider:  John Miller CRNA    Anesthesia Type:  general ASA Status:  2          Anesthesia Type: general  Last vitals  BP   129/73 (11/06/19 1902)   Temp   97.3 °F (36.3 °C) (11/06/19 1902)   Pulse   90 (11/06/19 1902)   Resp   20 (11/06/19 1845)     SpO2   100 % (11/06/19 1902)     Post Anesthesia Care and Evaluation    Patient location during evaluation: PACU  Level of consciousness: awake  Pain management: adequate  Airway patency: patent  Anesthetic complications: No anesthetic complications  PONV Status: none  Cardiovascular status: acceptable  Respiratory status: acceptable  Hydration status: acceptable

## 2019-11-07 NOTE — THERAPY DISCHARGE NOTE
Patient Name: Tobin Haile  : 1970    MRN: [id number]                              Today's Date: 2019       Admit Date: 2019    Visit Dx: No diagnosis found.  Patient Active Problem List   Diagnosis   • Stenosis, cervical spine   • Degenerative disc disease, cervical     Past Medical History:   Diagnosis Date   • Diabetes mellitus (CMS/Formerly [facility])      Past Surgical History:   Procedure Laterality Date   • ANTERIOR CERVICAL DISCECTOMY W/ FUSION N/A 10/16/2019    Procedure: ANTERIOR CERVICAL DISCECTOMY FUSION C3-7;  Surgeon: LAN Pop MD;  Location: [facility] OR;  Service: Orthopedic Spine   • CERVICAL LAMINECTOMY DECOMPRESSION POSTERIOR Bilateral 2019    Procedure: POSTERIOR SPINAL FUSION WITH INSTRUMENTATION C3-7;  Surgeon: LAN Pop MD;  Location: [facility] OR;  Service: Orthopedic Spine     General Information     Row Name 19 09          PT Evaluation Time/Intention    Document Type  evaluation s/p PSF C3-7, due to neck pain, HA, L deltoid, biceps, triceps, IR and ER.  -MS     Mode of Treatment  physical therapy;individual therapy  -MS     Row Name 19 09          General Information    Patient Profile Reviewed?  yes  -MS     Prior Level of Function  independent:;all household mobility;community mobility  -MS     Existing Precautions/Restrictions  spinal cervical collar at all times  -MS     Row Name 19          Relationship/Environment    Lives With  spouse  -MS     Row Name 19          Cognitive Assessment/Intervention- PT/OT    Orientation Status (Cognition)  oriented x 4  -MS     Row Name 19          Safety Issues, Functional Mobility    Impairments Affecting Function (Mobility)  pain  -MS       User Key  (r) = Recorded By, (t) = Taken By, (c) = Cosigned By    Initials Name Provider Type    MS Anahi Uriostegui R, PT, DPT, NCS Physical Therapist        Mobility     Row Name 19 1309          Bed Mobility Assessment/Treatment     Comment (Bed Mobility)  pt sitting EOB  -MS     Row Name 11/07/19 1309          Sit-Stand Transfer    Sit-Stand [place] (Transfers)  supervision  -MS     Row Name 11/07/19 1309          Gait/Stairs Assessment/Training    [place] Level (Gait)  supervision  -MS     Distance in Feet (Gait)  150ft with 3 standing breaks due to pain  -MS       User Key  (r) = Recorded By, (t) = Taken By, (c) = Cosigned By    Initials Name Provider Type    Anahi Gay KRISTA, PT, DPT, NCS Physical Therapist        Obj/Interventions     Row Name 11/07/19 1319          General ROM    GENERAL ROM COMMENTS  R shoulder abduction imipaired 50%, L shoulder abduction impaired 75%, chronic   -MS     Row Name 11/07/19 1319          MMT (Manual Muscle Testing)    General MMT Comments  R shoulder abduction 3-/5, L shoulder abduction 2/5  -MS     Row Name 11/07/19 1319          Sensory Assessment/Intervention    Sensory General Assessment  -- L arm impaired light touch in hand  -MS       User Key  (r) = Recorded By, (t) = Taken By, (c) = Cosigned By    Initials Name Provider Type    Anahi Gay KRISTA, PT, DPT, NCS Physical Therapist        Goals/Plan    No documentation.       Clinical Impression     [facility] Name 11/07/19 1320          Pain Assessment    Additional Documentation  Pain Scale: Numbers Pre/Post-Treatment (Group)  -MS     Row Name 11/07/19 1320          Pain Scale: Numbers Pre/Post-Treatment    Pain Scale: Numbers, Pretreatment  9/10  -MS     Pain Scale: Numbers, Post-Treatment  9/10  -MS     Pain Location  neck  -MS     Pre/Post Treatment Pain Comment  improved with B UE support  -MS     Pain Intervention(s)  Repositioned;Ambulation/increased activity  -MS     Row Name 11/07/19 1320          Plan of Care Review    Plan of Care Reviewed With  patient  -MS     [facility] Name 11/07/19 1320          Physical Therapy Clinical Impression    Criteria for Skilled Interventions Met (PT Clinical Impression)  yes;treatment indicated pt being  discharged today  -MS     Row Name 11/07/19 1320          Positioning and Restraints    Post Treatment Position  chair  -MS     In Chair  sitting;call light within reach;encouraged to call for assist;with family/caregiver;with brace B UEs supported on pillows  -MS       User Key  (r) = Recorded By, (t) = Taken By, (c) = Cosigned By    Initials Name Provider Type    Norman Gayprema VELASCO, PT, DPT, NCS Physical Therapist        Outcome Measures     Row Name 11/07/19 1322          How much help from another person do you currently need...    Turning from your back to your side while in flat bed without using bedrails?  4  -MS     Moving from lying on back to sitting on the side of a flat bed without bedrails?  4  -MS     Moving to and from a bed to a chair (including a wheelchair)?  4  -MS     Standing up from a chair using your arms (e.g., wheelchair, bedside chair)?  4  -MS     Climbing 3-5 steps with a railing?  4  -MS     To walk in hospital room?  4  -MS     AM-PAC 6 Clicks Score (PT)  24  -MS     Row Name 11/07/19 1322          Functional Assessment    Outcome Measure Options  AM-PAC 6 Clicks Basic Mobility (PT)  -MS       User Key  (r) = Recorded By, (t) = Taken By, (c) = Cosigned By    Initials Name Provider Type    MS Anahi Uriostegui, PT, DPT, NCS Physical Therapist          PT Recommendation and Plan     Outcome Summary/Treatment Plan (PT)  Anticipated Discharge Disposition (PT): home with assist, home with OP services  Plan of Care Reviewed With: patient  Progress: improving  Outcome Summary: PT evaluation completed. The patient demonstrates L deltoid weakness which is chronic prior to surgery and light touch impairment in the L hand. He demonstrates independence with functional mobility and understanding of the cervical collar. He will benefit from outpatient therapy for his UE weakness. Recommend discharge home with assist.     Time Calculation:   PT Charges     Row Name 11/07/19 1322             Time  Calculation    Start Time  0900  -MS      Stop Time  0928  -MS      Time Calculation (min)  28 min  -MS      PT Received On  11/07/19  -MS        User Key  (r) = Recorded By, (t) = Taken By, (c) = Cosigned By    Initials Name Provider Type    Anahi Gay, PT, DPT, NCS Physical Therapist        Therapy Charges for Today     Code Description Service Date Service Provider Modifiers Qty    [id number] HC PT EVAL LOW COMPLEXITY 3 11/7/2019 Anahi Uriostegui PT, DPT, NCS GP 1          PT G-Codes  Outcome Measure Options: AM-PAC 6 Clicks Basic Mobility (PT)  AM-PAC 6 Clicks Score (PT): 24    PT Discharge Summary  Anticipated Discharge Disposition (PT): home with assist, home with OP services    Anahi Uriostegui, PT, DPBHAVIN, LATA  11/7/2019

## (undated) DEVICE — PK SPINE POST 30

## (undated) DEVICE — GLV SURG SENSICARE W/ALOE PF LF 7.5 STRL

## (undated) DEVICE — 4-PORT MANIFOLD: Brand: NEPTUNE 2

## (undated) DEVICE — TOTAL TRAY, 16FR 10ML SIL FOLEY, URN: Brand: MEDLINE

## (undated) DEVICE — GLV SURG SENSICARE PI LF PF 8 GRN STRL

## (undated) DEVICE — GLV SURG SENSICARE PI LF PF 7.5 GRN STRL

## (undated) DEVICE — GLV SURG BIOGEL LTX PF 7 1/2

## (undated) DEVICE — DRSNG TELFA PAD NONADH STR 1S 3X8IN

## (undated) DEVICE — PIN DISTRACT TI 14MM STRL

## (undated) DEVICE — SPNG GZ WOVN 4X4IN 12PLY 10/BX STRL

## (undated) DEVICE — SUT ETHLN 3/0 FS1 30IN 669H

## (undated) DEVICE — SHEET,DRAPE,53X77,STERILE: Brand: MEDLINE

## (undated) DEVICE — 3.0MM PRECISION NEURO (MATCH HEAD)

## (undated) DEVICE — ANTIBACTERIAL UNDYED BRAIDED (POLYGLACTIN 910), SYNTHETIC ABSORBABLE SUTURE: Brand: COATED VICRYL

## (undated) DEVICE — HALTR TRACT HD CERV STD UNIV

## (undated) DEVICE — GLV SURG SENSICARE W/ALOE PF LF 8 STRL

## (undated) DEVICE — TP SILK DURAPORE 3IN

## (undated) DEVICE — SPNG DISSCT CHRRY 3/8IN STRL PK/5

## (undated) DEVICE — PACK,UNIVERSAL,NO GOWNS: Brand: MEDLINE

## (undated) DEVICE — RESERVOIR,SUCTION,100CC,SILICONE: Brand: MEDLINE

## (undated) DEVICE — THE PMT CERVICAL VISUALIZATION HARNESS STRETCHES TO CONFORM TO THE OUTER SHOULDER WHILE HOLDING TRACTION SECURELY.IT IS USED TO ACHIEVE SAFE, EFFECTIVE TRACTION ON THE SHOULDERS FOR INTRAOPERATIVE CERVICAL X-RAYS.: Brand: PMT CERVICAL HARNESS

## (undated) DEVICE — DRAPE,UTILITY,TAPE,15X26,STERILE: Brand: MEDLINE

## (undated) DEVICE — THE DTRAX® SPINAL SYSTEM IS A SET OF INSTRUMENTS INTENDED AND INDICATED FOR ACCESS AND PREPARATION OF A SPINAL JOINT TO AID IN FUSION.: Brand: DTRAX® SPINAL SYSTEM

## (undated) DEVICE — SYS SKIN CLS DERMABOND PRINEO W/22CM MESH TP

## (undated) DEVICE — CATH IV ANGIO FEP 12G 3IN LTBLU 10PK

## (undated) DEVICE — Device

## (undated) DEVICE — GAUZE,SPONGE,4"X4",16PLY,XRAY,STRL,LF: Brand: MEDLINE

## (undated) DEVICE — KT DRN EVAC WND PVC PCH WTROC RND 10F400

## (undated) DEVICE — YANKAUER SUCTION INSTRUMENT WITHOUT CONTROL VENT, OPEN TIP, CLEAR: Brand: YANKAUER

## (undated) DEVICE — GLV SURG BIOGEL LTX PF 6 1/2

## (undated) DEVICE — SUT VIC 2 CP 27IN UD VCP195H

## (undated) DEVICE — PK SPINE CERV ANT 30

## (undated) DEVICE — APPL CHLORAPREP W/TINT 26ML ORNG

## (undated) DEVICE — 3M™ STERI-STRIP™ REINFORCED ADHESIVE SKIN CLOSURES, R1547, 1/2 IN X 4 IN (12 MM X 100 MM), 6 STRIPS/ENVELOPE: Brand: 3M™ STERI-STRIP™

## (undated) DEVICE — DRP C/ARMOR

## (undated) DEVICE — PK TURNOVER RM ADV

## (undated) DEVICE — ELECTRD NDL EDGE/INSUL/PFTE.787MM 2.84IN